# Patient Record
Sex: FEMALE | Race: BLACK OR AFRICAN AMERICAN | NOT HISPANIC OR LATINO | Employment: UNEMPLOYED | URBAN - METROPOLITAN AREA
[De-identification: names, ages, dates, MRNs, and addresses within clinical notes are randomized per-mention and may not be internally consistent; named-entity substitution may affect disease eponyms.]

---

## 2023-06-05 LAB
ABO GROUP (TYPE) IN BLOOD: NORMAL
ANTIBODY SCREEN: NORMAL
ERYTHROCYTE DISTRIBUTION WIDTH (RATIO) BY AUTOMATED COUNT: 12.7 % (ref 11.5–14.5)
ERYTHROCYTE MEAN CORPUSCULAR HEMOGLOBIN CONCENTRATION (G/DL) BY AUTOMATED: 33.4 G/DL (ref 32–36)
ERYTHROCYTE MEAN CORPUSCULAR VOLUME (FL) BY AUTOMATED COUNT: 88 FL (ref 80–100)
ERYTHROCYTES (10*6/UL) IN BLOOD BY AUTOMATED COUNT: 4.26 X10E12/L (ref 4–5.2)
HEMATOCRIT (%) IN BLOOD BY AUTOMATED COUNT: 37.7 % (ref 36–46)
HEMOGLOBIN (G/DL) IN BLOOD: 12.6 G/DL (ref 12–16)
HEPATITIS B VIRUS SURFACE AG PRESENCE IN SERUM: NONREACTIVE
HEPATITIS C VIRUS AB PRESENCE IN SERUM: NONREACTIVE
HIV 1/ 2 AG/AB SCREEN: NONREACTIVE
LEUKOCYTES (10*3/UL) IN BLOOD BY AUTOMATED COUNT: 12.6 X10E9/L (ref 4.4–11.3)
NRBC (PER 100 WBCS) BY AUTOMATED COUNT: 0 /100 WBC (ref 0–0)
PLATELETS (10*3/UL) IN BLOOD AUTOMATED COUNT: 275 X10E9/L (ref 150–450)
REFLEX ADDED, ANEMIA PANEL: ABNORMAL
RH FACTOR: NORMAL
RUBELLA VIRUS IGG AB: POSITIVE
SYPHILIS TOTAL AB: NONREACTIVE

## 2023-06-06 LAB
CHLAMYDIA TRACH., AMPLIFIED: NEGATIVE
HEMOGLOBIN A2: 2.8 %
HEMOGLOBIN A: 96.7 %
HEMOGLOBIN F: 0.5 %
HEMOGLOBIN IDENTIFICATION INTERPRETATION: NORMAL
N. GONORRHEA, AMPLIFIED: NEGATIVE
PATH REVIEW-HGB IDENTIFICATION: NORMAL
URINE CULTURE: NO GROWTH

## 2023-07-13 LAB
ALANINE AMINOTRANSFERASE (SGPT) (U/L) IN SER/PLAS: 9 U/L (ref 7–45)
ALBUMIN (G/DL) IN SER/PLAS: 3.7 G/DL (ref 3.4–5)
ALKALINE PHOSPHATASE (U/L) IN SER/PLAS: 60 U/L (ref 33–110)
ANION GAP IN SER/PLAS: 13 MMOL/L (ref 10–20)
ASPARTATE AMINOTRANSFERASE (SGOT) (U/L) IN SER/PLAS: 13 U/L (ref 9–39)
BILIRUBIN TOTAL (MG/DL) IN SER/PLAS: 0.2 MG/DL (ref 0–1.2)
CALCIUM (MG/DL) IN SER/PLAS: 9 MG/DL (ref 8.6–10.6)
CARBON DIOXIDE, TOTAL (MMOL/L) IN SER/PLAS: 22 MMOL/L (ref 21–32)
CHLORIDE (MMOL/L) IN SER/PLAS: 104 MMOL/L (ref 98–107)
CREATININE (MG/DL) IN SER/PLAS: 0.57 MG/DL (ref 0.5–1.05)
CREATININE (MG/DL) IN URINE: 140 MG/DL (ref 20–320)
GFR FEMALE: >90 ML/MIN/1.73M2
GLUCOSE (MG/DL) IN SER/PLAS: 82 MG/DL (ref 74–99)
LACTATE DEHYDROGENASE (U/L) IN SER/PLAS BY LAC->PYR RXN: 98 U/L (ref 84–246)
POTASSIUM (MMOL/L) IN SER/PLAS: 3.9 MMOL/L (ref 3.5–5.3)
PROTEIN (MG/DL) IN URINE: 10 MG/DL (ref 5–24)
PROTEIN TOTAL: 6.7 G/DL (ref 6.4–8.2)
PROTEIN/CREATININE (MG/MG) IN URINE: 0.07 MG/MG CREAT (ref 0–0.17)
SODIUM (MMOL/L) IN SER/PLAS: 135 MMOL/L (ref 136–145)
THYROTROPIN (MIU/L) IN SER/PLAS BY DETECTION LIMIT <= 0.05 MIU/L: 1.01 MIU/L (ref 0.44–3.98)
URATE (MG/DL) IN SER/PLAS: 3.5 MG/DL (ref 2.3–6.7)
UREA NITROGEN (MG/DL) IN SER/PLAS: 6 MG/DL (ref 6–23)

## 2023-07-14 LAB
ESTIMATED AVERAGE GLUCOSE FOR HBA1C: 105 MG/DL
HEMOGLOBIN A1C/HEMOGLOBIN TOTAL IN BLOOD: 5.3 %

## 2023-10-19 PROBLEM — O10.013: Status: ACTIVE | Noted: 2023-10-19

## 2023-10-19 PROBLEM — O24.419 GESTATIONAL DIABETES MELLITUS (GDM) IN THIRD TRIMESTER (HHS-HCC): Status: ACTIVE | Noted: 2023-10-19

## 2023-10-19 PROBLEM — E04.9 GOITER: Status: ACTIVE | Noted: 2023-10-19

## 2023-10-19 PROBLEM — N91.2 AMENORRHEA: Status: ACTIVE | Noted: 2023-10-19

## 2023-10-19 PROBLEM — O11.9 CHRONIC HYPERTENSION WITH SUPERIMPOSED PREECLAMPSIA (HHS-HCC): Status: ACTIVE | Noted: 2023-10-19

## 2023-10-19 PROBLEM — O13.3 GESTATIONAL HYPERTENSION, THIRD TRIMESTER (HHS-HCC): Status: ACTIVE | Noted: 2023-10-19

## 2023-10-19 PROBLEM — O24.414 INSULIN CONTROLLED GESTATIONAL DIABETES MELLITUS (GDM) DURING PREGNANCY, ANTEPARTUM (HHS-HCC): Status: ACTIVE | Noted: 2023-10-19

## 2023-10-19 PROBLEM — O99.213 OBESITY AFFECTING PREGNANCY IN THIRD TRIMESTER (HHS-HCC): Status: ACTIVE | Noted: 2023-10-19

## 2023-10-19 RX ORDER — LANCETS
EACH MISCELLANEOUS
COMMUNITY
Start: 2022-04-26

## 2023-10-19 RX ORDER — LABETALOL 100 MG/1
1 TABLET, FILM COATED ORAL 2 TIMES DAILY
COMMUNITY
Start: 2022-04-21 | End: 2023-10-20 | Stop reason: ALTCHOICE

## 2023-10-19 RX ORDER — NIFEDIPINE 30 MG/1
1 TABLET, FILM COATED, EXTENDED RELEASE ORAL DAILY
COMMUNITY
Start: 2022-06-16 | End: 2023-10-20 | Stop reason: ALTCHOICE

## 2023-10-20 ENCOUNTER — ROUTINE PRENATAL (OUTPATIENT)
Dept: OBSTETRICS AND GYNECOLOGY | Facility: CLINIC | Age: 23
End: 2023-10-20
Payer: COMMERCIAL

## 2023-10-20 VITALS — WEIGHT: 259 LBS | DIASTOLIC BLOOD PRESSURE: 79 MMHG | BODY MASS INDEX: 39.38 KG/M2 | SYSTOLIC BLOOD PRESSURE: 122 MMHG

## 2023-10-20 DIAGNOSIS — Z3A.36 36 WEEKS GESTATION OF PREGNANCY (HHS-HCC): Primary | ICD-10-CM

## 2023-10-20 DIAGNOSIS — O09.90 HIGH RISK PREGNANCY, ANTEPARTUM (HHS-HCC): ICD-10-CM

## 2023-10-20 PROBLEM — N91.2 AMENORRHEA: Status: RESOLVED | Noted: 2023-10-19 | Resolved: 2023-10-20

## 2023-10-20 PROBLEM — O24.419 GESTATIONAL DIABETES MELLITUS (GDM) IN THIRD TRIMESTER (HHS-HCC): Status: RESOLVED | Noted: 2023-10-19 | Resolved: 2023-10-20

## 2023-10-20 PROBLEM — Z86.32 HISTORY OF GESTATIONAL DIABETES MELLITUS (GDM): Status: ACTIVE | Noted: 2023-10-20

## 2023-10-20 PROBLEM — O10.013: Status: RESOLVED | Noted: 2023-10-19 | Resolved: 2023-10-20

## 2023-10-20 PROBLEM — O09.299 H/O SHOULDER DYSTOCIA IN PRIOR PREGNANCY, CURRENTLY PREGNANT (HHS-HCC): Status: ACTIVE | Noted: 2023-10-20

## 2023-10-20 PROBLEM — O13.3 GESTATIONAL HYPERTENSION, THIRD TRIMESTER (HHS-HCC): Status: RESOLVED | Noted: 2023-10-19 | Resolved: 2023-10-20

## 2023-10-20 PROBLEM — E04.9 GOITER: Status: RESOLVED | Noted: 2023-10-19 | Resolved: 2023-10-20

## 2023-10-20 PROBLEM — O11.9 CHRONIC HYPERTENSION WITH SUPERIMPOSED PREECLAMPSIA (HHS-HCC): Status: RESOLVED | Noted: 2023-10-19 | Resolved: 2023-10-20

## 2023-10-20 PROBLEM — Z87.59 H/O SEVERE PRE-ECLAMPSIA: Status: ACTIVE | Noted: 2023-10-20

## 2023-10-20 PROBLEM — O24.414 INSULIN CONTROLLED GESTATIONAL DIABETES MELLITUS (GDM) DURING PREGNANCY, ANTEPARTUM (HHS-HCC): Status: RESOLVED | Noted: 2023-10-19 | Resolved: 2023-10-20

## 2023-10-20 PROCEDURE — 99214 OFFICE O/P EST MOD 30 MIN: CPT | Mod: GC,TH | Performed by: STUDENT IN AN ORGANIZED HEALTH CARE EDUCATION/TRAINING PROGRAM

## 2023-10-20 PROCEDURE — 87081 CULTURE SCREEN ONLY: CPT | Performed by: STUDENT IN AN ORGANIZED HEALTH CARE EDUCATION/TRAINING PROGRAM

## 2023-10-20 PROCEDURE — 99214 OFFICE O/P EST MOD 30 MIN: CPT | Performed by: STUDENT IN AN ORGANIZED HEALTH CARE EDUCATION/TRAINING PROGRAM

## 2023-10-20 NOTE — PROGRESS NOTES
Subjective   Patient ID [id number]     Lavon Longo is a 23 y.o.  at 36w0d with a working estimated date of delivery of 2023, by Ultrasound who presents for a routine prenatal visit. She denies vaginal bleeding, leakage of fluid, decreased fetal movements, or contractions.    Her pregnancy is complicated by:  - significant n/v early in pregnancy which she says is now improving with only 2 emesis/ week. Of note, weight on 23 was 262 lbs and on 10/20/23 is 259 lbs  - previous pregnancy notable for IOL @ 36w in s/o sPEC and GDMA2 c/b shoulder dystocia x 1 min    Objective   Physical Exam:   Weight: 117 kg (259 lb)  Expected Total Weight Gain: Could not be calculated   Pregravid BMI: Could not be calculated  BP: 122/79  Fetal Heart Rate: 155             Physical Exam  Constitutional:       Appearance: Normal appearance.   HENT:      Head: Normocephalic and atraumatic.   Eyes:      Extraocular Movements: Extraocular movements intact.      Conjunctiva/sclera: Conjunctivae normal.   Pulmonary:      Effort: Pulmonary effort is normal.   Musculoskeletal:         General: Normal range of motion.      Cervical back: Normal range of motion and neck supple.   Neurological:      General: No focal deficit present.      Mental Status: She is alert and oriented to person, place, and time.   Skin:     General: Skin is warm and dry.   Psychiatric:         Mood and Affect: Mood normal.         Behavior: Behavior normal.   Vitals reviewed.        Prenatal Labs  - PNL: wnl  - NO 1 hr GCT done  Lab Results   Component Value Date    HGB 12.6 2023    HCT 37.7 2023    ABO B 2023    HEPBSAG NONREACTIVE 2023       Imaging  The most recent ultrasound was performed on 23 with a study GA of 20w0d (ELVIS: 23) and EFW of 318g.  - anatomy scan: wnl    Assessment/Plan   Problem List Items Addressed This Visit       High risk pregnancy, antepartum    Overview     Plans to breastfeed  Interested in  [facility]         Current Assessment & Plan     - scant care  - declined flu vaccine  - unable to complete 2nd tri labs today  - unable to stay for NST  - has not completed glucose screening, does not have supplies at home to check BG  - GBS collected today  - plan to RTC for appt and growth          Other Visit Diagnoses       36 weeks gestation of pregnancy    -  Primary    Relevant Orders    Group B Streptococcus (GBS) Prenatal Screen, Culture    US OB follow UP transabdominal approach          Seen and discussed with Dileep Ayon and Rosalinda Puente, MS3    Patient seen and evaluated with medical student. Agree with assessment above, edits made within text.   Tanya Ayon MD, PGY-3

## 2023-10-21 NOTE — ASSESSMENT & PLAN NOTE
- scant care  - declined flu vaccine  - unable to complete 2nd tri labs today  - unable to stay for NST  - has not completed glucose screening, does not have supplies at home to check BG  - GBS collected today  - plan to RTC for appt and growth

## 2023-10-23 LAB — GP B STREP GENITAL QL CULT: NORMAL

## 2023-10-24 ENCOUNTER — ANCILLARY PROCEDURE (OUTPATIENT)
Dept: RADIOLOGY | Facility: CLINIC | Age: 23
End: 2023-10-24
Payer: COMMERCIAL

## 2023-10-24 ENCOUNTER — ROUTINE PRENATAL (OUTPATIENT)
Dept: OBSTETRICS AND GYNECOLOGY | Facility: CLINIC | Age: 23
End: 2023-10-24
Payer: COMMERCIAL

## 2023-10-24 VITALS — DIASTOLIC BLOOD PRESSURE: 71 MMHG | BODY MASS INDEX: 39.73 KG/M2 | SYSTOLIC BLOOD PRESSURE: 123 MMHG | WEIGHT: 261.3 LBS

## 2023-10-24 DIAGNOSIS — Z86.32 HISTORY OF GESTATIONAL DIABETES MELLITUS (GDM): ICD-10-CM

## 2023-10-24 DIAGNOSIS — Z87.59 H/O SEVERE PRE-ECLAMPSIA: ICD-10-CM

## 2023-10-24 DIAGNOSIS — Z3A.36 36 WEEKS GESTATION OF PREGNANCY (HHS-HCC): Primary | ICD-10-CM

## 2023-10-24 DIAGNOSIS — O09.299 H/O SHOULDER DYSTOCIA IN PRIOR PREGNANCY, CURRENTLY PREGNANT (HHS-HCC): ICD-10-CM

## 2023-10-24 DIAGNOSIS — O09.90 HIGH RISK PREGNANCY, ANTEPARTUM (HHS-HCC): ICD-10-CM

## 2023-10-24 DIAGNOSIS — Z03.74 ENCOUNTER FOR SUSPECTED PROBLEM WITH FETAL GROWTH RULED OUT: ICD-10-CM

## 2023-10-24 PROBLEM — E66.9 OBESITY: Status: ACTIVE | Noted: 2023-08-10

## 2023-10-24 PROCEDURE — 90715 TDAP VACCINE 7 YRS/> IM: CPT | Performed by: OBSTETRICS & GYNECOLOGY

## 2023-10-24 PROCEDURE — 99214 OFFICE O/P EST MOD 30 MIN: CPT | Mod: TH | Performed by: OBSTETRICS & GYNECOLOGY

## 2023-10-24 PROCEDURE — 76819 FETAL BIOPHYS PROFIL W/O NST: CPT | Performed by: OBSTETRICS & GYNECOLOGY

## 2023-10-24 PROCEDURE — 99214 OFFICE O/P EST MOD 30 MIN: CPT | Performed by: OBSTETRICS & GYNECOLOGY

## 2023-10-24 PROCEDURE — 76819 FETAL BIOPHYS PROFIL W/O NST: CPT

## 2023-10-24 PROCEDURE — 76816 OB US FOLLOW-UP PER FETUS: CPT | Performed by: OBSTETRICS & GYNECOLOGY

## 2023-10-24 PROCEDURE — 76816 OB US FOLLOW-UP PER FETUS: CPT

## 2023-10-24 ASSESSMENT — ENCOUNTER SYMPTOMS
HEMATOLOGIC/LYMPHATIC NEGATIVE: 0
PSYCHIATRIC NEGATIVE: 0
NEUROLOGICAL NEGATIVE: 0
MUSCULOSKELETAL NEGATIVE: 0
ENDOCRINE NEGATIVE: 0
RESPIRATORY NEGATIVE: 0
ALLERGIC/IMMUNOLOGIC NEGATIVE: 0
CONSTITUTIONAL NEGATIVE: 0
EYES NEGATIVE: 0
CARDIOVASCULAR NEGATIVE: 0
GASTROINTESTINAL NEGATIVE: 0

## 2023-10-24 NOTE — PROGRESS NOTES
"Subjective   Patient ID [id number]   Lavon Longo is a 23 y.o.  at 36w4d with a working estimated date of delivery of 2023, by Ultrasound who presents for a routine prenatal visit. She denies vaginal bleeding, leakage of fluid, decreased fetal movements, or contractions.    Her pregnancy is complicated by:  Pregnancy Problems (from 10/20/23 to present)       Problem Noted Resolved    36 weeks gestation of pregnancy 10/24/2023 by Corinne A Bazella, MD No    Priority:  Medium      H/O severe pre-eclampsia 10/20/2023 by Tanya Ayon MD No    Priority:  Medium      History of gestational diabetes mellitus (GDM) 10/20/2023 by Tanya Ayon MD No    Priority:  Medium      High risk pregnancy, antepartum 10/20/2023 by Tanya Ayon MD No    Priority:  Medium      Overview Addendum 10/24/2023  5:06 PM by Corinne A Bazella, MD     Datin week US uncertain LMP  [] Initial BMI:   [x] Prenatal Labs:   [] Genetic Screening:    [] Baby ASA:  [] Anatomy US:  [] 1hr GCT at 24-28wks:  [x] Tdap (27-36wks): 10/24  [] Flu Shot:  [] COVID vaccine:   [] Rhogam (if Rh neg):   [] GBS at 36 wks: collected/pending   [x] Breastfeeding- yes   [x] Postpartum Birth control method: LARC  [] 39 weeks discussion of IOL vs. Expectant management:  [] Mode of delivery:           H/O shoulder dystocia in prior pregnancy, currently pregnant 10/20/2023 by Tanya Ayon MD No    Priority:  Medium      Overview Addendum 10/24/2023  5:42 PM by Pauline Dobson MD     GDM in G1, patient unaware at the time, child with no deficits  Patient desires vaginal delivery          Obesity 8/10/2023 by Giovana Garcia No    Priority:  Medium               Objective   Physical Exam:   Weight: 119 kg (261 lb 4.8 oz)  Expected Total Weight Gain: Could not be calculated   Pregravid BMI: Could not be calculated  BP: 123/71  Fetal Heart Rate: 152               Prenatal Labs  Urine Dip:  No results found for: \"KETONESU\", \"GLUCOSEUR\", " "\"LEUKOCYTESUR\"  Lab Results   Component Value Date    HGB 12.6 06/05/2023    HCT 37.7 06/05/2023    ABO B 06/05/2023    HEPBSAG NONREACTIVE 06/05/2023     No results found for: \"PAPPA\", \"AFP\", \"HCG\", \"ESTRIOL\", \"INHBA\"  No results found for: \"GLUF\", \"GLUT1\", \"DZUWYJV3FO\", \"UKPYOFI9GP\"    Imaging  The most recent ultrasound was performed on The most recent ultrasound study is not finalized with a study GA of The most recent ultrasound study is not finalized and EFW of The most recent ultrasound study is not finalized.  The most recent ultrasound study is not finalized  The most recent ultrasound study is not finalized    Assessment/Plan   Problem List Items Addressed This Visit             ICD-10-CM    36 weeks gestation of pregnancy - Primary Z3A.36    Relevant Orders    Glucose, 1 Hour Screen, Pregnancy    CBC Anemia Panel With Reflex, Pregnancy    H/O shoulder dystocia in prior pregnancy, currently pregnant O09.299    High risk pregnancy, antepartum O09.90    Relevant Orders    Tdap vaccine, age 7 years and older  (BOOSTRIX) (Completed)    History of gestational diabetes mellitus (GDM) Z86.32     Continue prenatal vitamin.  Labs reviewed, second trimester labs reviewed, to get done along w/ 1-hour tomorrow  Growth US today  GBS pending  Expected mode of delivery vaginal delivery. Pt counseled on risk of repeat shoulder, desires vaginal delivery   Follow up in 1 week for a routine prenatal visit.    Pt seen and dw Dr Nasim Dobson MD  OB/GYN PGY3   "

## 2023-10-26 NOTE — PROGRESS NOTES
I saw and evaluated the patient. I personally obtained the key and critical portions of the history and physical exam or was physically present for key and critical portions performed by the resident/fellow. I reviewed the resident/fellow's documentation and discussed the patient with the resident/fellow. I agree with the resident/fellow's medical decision making as documented in the note.    Corinne A Bazella, MD

## 2023-10-29 ENCOUNTER — HOSPITAL ENCOUNTER (OUTPATIENT)
Facility: HOSPITAL | Age: 23
Discharge: HOME | End: 2023-10-29
Attending: OBSTETRICS & GYNECOLOGY | Admitting: OBSTETRICS & GYNECOLOGY
Payer: COMMERCIAL

## 2023-10-29 VITALS
HEART RATE: 85 BPM | DIASTOLIC BLOOD PRESSURE: 58 MMHG | TEMPERATURE: 96.8 F | RESPIRATION RATE: 16 BRPM | SYSTOLIC BLOOD PRESSURE: 122 MMHG | OXYGEN SATURATION: 100 %

## 2023-10-29 PROBLEM — Z3A.37 37 WEEKS GESTATION OF PREGNANCY (HHS-HCC): Status: ACTIVE | Noted: 2023-10-24

## 2023-10-29 PROBLEM — R07.82 INTERCOSTAL PAIN: Status: ACTIVE | Noted: 2023-10-29

## 2023-10-29 LAB
ERYTHROCYTE [DISTWIDTH] IN BLOOD BY AUTOMATED COUNT: 13.3 % (ref 11.5–14.5)
EST. AVERAGE GLUCOSE BLD GHB EST-MCNC: 103 MG/DL
HBA1C MFR BLD: 5.2 %
HCT VFR BLD AUTO: 37 % (ref 36–46)
HGB BLD-MCNC: 12.1 G/DL (ref 12–16)
MCH RBC QN AUTO: 29.4 PG (ref 26–34)
MCHC RBC AUTO-ENTMCNC: 32.7 G/DL (ref 32–36)
MCV RBC AUTO: 90 FL (ref 80–100)
NRBC BLD-RTO: 0 /100 WBCS (ref 0–0)
PLATELET # BLD AUTO: 234 X10*3/UL (ref 150–450)
PMV BLD AUTO: 10.7 FL (ref 7.5–11.5)
RBC # BLD AUTO: 4.11 X10*6/UL (ref 4–5.2)
WBC # BLD AUTO: 13.1 X10*3/UL (ref 4.4–11.3)

## 2023-10-29 PROCEDURE — 99214 OFFICE O/P EST MOD 30 MIN: CPT

## 2023-10-29 PROCEDURE — 83036 HEMOGLOBIN GLYCOSYLATED A1C: CPT | Mod: AHULAB | Performed by: OBSTETRICS & GYNECOLOGY

## 2023-10-29 PROCEDURE — 36415 COLL VENOUS BLD VENIPUNCTURE: CPT | Performed by: OBSTETRICS & GYNECOLOGY

## 2023-10-29 PROCEDURE — 99214 OFFICE O/P EST MOD 30 MIN: CPT | Performed by: OBSTETRICS & GYNECOLOGY

## 2023-10-29 PROCEDURE — 85027 COMPLETE CBC AUTOMATED: CPT | Performed by: OBSTETRICS & GYNECOLOGY

## 2023-10-29 RX ORDER — NIFEDIPINE 10 MG/1
10 CAPSULE ORAL ONCE AS NEEDED
Status: DISCONTINUED | OUTPATIENT
Start: 2023-10-29 | End: 2023-10-29 | Stop reason: HOSPADM

## 2023-10-29 RX ORDER — HYDRALAZINE HYDROCHLORIDE 20 MG/ML
5 INJECTION INTRAMUSCULAR; INTRAVENOUS ONCE AS NEEDED
Status: DISCONTINUED | OUTPATIENT
Start: 2023-10-29 | End: 2023-10-29 | Stop reason: HOSPADM

## 2023-10-29 RX ORDER — ONDANSETRON 4 MG/1
4 TABLET, FILM COATED ORAL EVERY 6 HOURS PRN
Status: DISCONTINUED | OUTPATIENT
Start: 2023-10-29 | End: 2023-10-29 | Stop reason: HOSPADM

## 2023-10-29 RX ORDER — ONDANSETRON HYDROCHLORIDE 2 MG/ML
4 INJECTION, SOLUTION INTRAVENOUS EVERY 6 HOURS PRN
Status: DISCONTINUED | OUTPATIENT
Start: 2023-10-29 | End: 2023-10-29 | Stop reason: HOSPADM

## 2023-10-29 RX ORDER — LABETALOL HYDROCHLORIDE 5 MG/ML
20 INJECTION, SOLUTION INTRAVENOUS ONCE AS NEEDED
Status: DISCONTINUED | OUTPATIENT
Start: 2023-10-29 | End: 2023-10-29 | Stop reason: HOSPADM

## 2023-10-29 SDOH — HEALTH STABILITY: MENTAL HEALTH: WISH TO BE DEAD (PAST 1 MONTH): NO

## 2023-10-29 SDOH — SOCIAL STABILITY: SOCIAL INSECURITY: HAS ANYONE EVER THREATENED TO HURT YOUR FAMILY OR YOUR PETS?: NO

## 2023-10-29 SDOH — HEALTH STABILITY: MENTAL HEALTH: NON-SPECIFIC ACTIVE SUICIDAL THOUGHTS (PAST 1 MONTH): NO

## 2023-10-29 SDOH — SOCIAL STABILITY: SOCIAL INSECURITY: ARE THERE ANY APPARENT SIGNS OF INJURIES/BEHAVIORS THAT COULD BE RELATED TO ABUSE/NEGLECT?: NO

## 2023-10-29 SDOH — SOCIAL STABILITY: SOCIAL INSECURITY: DO YOU FEEL ANYONE HAS EXPLOITED OR TAKEN ADVANTAGE OF YOU FINANCIALLY OR OF YOUR PERSONAL PROPERTY?: NO

## 2023-10-29 SDOH — HEALTH STABILITY: MENTAL HEALTH: WERE YOU ABLE TO COMPLETE ALL THE BEHAVIORAL HEALTH SCREENINGS?: YES

## 2023-10-29 SDOH — SOCIAL STABILITY: SOCIAL INSECURITY: VERBAL ABUSE: DENIES

## 2023-10-29 SDOH — ECONOMIC STABILITY: HOUSING INSECURITY: DO YOU FEEL UNSAFE GOING BACK TO THE PLACE WHERE YOU ARE LIVING?: NO

## 2023-10-29 SDOH — SOCIAL STABILITY: SOCIAL INSECURITY: HAVE YOU HAD THOUGHTS OF HARMING ANYONE ELSE?: YES

## 2023-10-29 SDOH — SOCIAL STABILITY: SOCIAL INSECURITY: ABUSE SCREEN: ADULT

## 2023-10-29 SDOH — HEALTH STABILITY: MENTAL HEALTH: SUICIDAL BEHAVIOR (LIFETIME): NO

## 2023-10-29 SDOH — SOCIAL STABILITY: SOCIAL INSECURITY: DOES ANYONE TRY TO KEEP YOU FROM HAVING/CONTACTING OTHER FRIENDS OR DOING THINGS OUTSIDE YOUR HOME?: NO

## 2023-10-29 SDOH — SOCIAL STABILITY: SOCIAL INSECURITY: PHYSICAL ABUSE: DENIES

## 2023-10-29 SDOH — SOCIAL STABILITY: SOCIAL INSECURITY: ARE YOU OR HAVE YOU BEEN THREATENED OR ABUSED PHYSICALLY, EMOTIONALLY, OR SEXUALLY BY ANYONE?: NO

## 2023-10-29 ASSESSMENT — LIFESTYLE VARIABLES
HOW OFTEN DO YOU HAVE A DRINK CONTAINING ALCOHOL: NEVER
HOW OFTEN DO YOU HAVE 6 OR MORE DRINKS ON ONE OCCASION: NEVER
SKIP TO QUESTIONS 9-10: 1
AUDIT-C TOTAL SCORE: 0
HOW MANY STANDARD DRINKS CONTAINING ALCOHOL DO YOU HAVE ON A TYPICAL DAY: PATIENT DOES NOT DRINK
AUDIT-C TOTAL SCORE: 0

## 2023-10-29 ASSESSMENT — PAIN SCALES - GENERAL
PAINLEVEL_OUTOF10: 5 - MODERATE PAIN
PAINLEVEL_OUTOF10: 5 - MODERATE PAIN

## 2023-10-29 ASSESSMENT — PATIENT HEALTH QUESTIONNAIRE - PHQ9
1. LITTLE INTEREST OR PLEASURE IN DOING THINGS: NOT AT ALL
2. FEELING DOWN, DEPRESSED OR HOPELESS: NOT AT ALL
SUM OF ALL RESPONSES TO PHQ9 QUESTIONS 1 & 2: 0

## 2023-10-29 NOTE — DISCHARGE SUMMARY
Discharge Summary    Admission Date: 10/29/2023  Discharge Date: 10/29/2023    Discharge Diagnosis  Chest wall pain, 37 weeks.     Hospital Course  Patient presented at 37 weeks gestation with chest wall pain. Evaluation included normal ECG, normal oxygen saturation, and reproduction of pain with palpation of chest wall. She was reassured and FHR remained category 1. Review of prenatal records also demonstrated she had not had glucose testing and has a history of insulin dependent gestational diabetes last pregnancy. At first she agreed to have glucola, CBC and Hgb A1c performed while here, but she later decided to leave. She did allow CBC and Hgb A1c to be drawn prior to discharge. She was encouraged to keep next scheduled prenatal visit in 2 days.     Procedures: none    Pertinent Physical Exam At Time of Discharge  GENERAL: Examination reveals a well developed, well nourished, gravid female in no acute distress. She is alert and cooperative.  ABDOMEN: soft, gravid, nontender, nondistended, no abnormal masses, no epigastric pain  PSYCHOLOGICAL: awake and alert; oriented to person, place, and time      Discharge Meds     Your medication list        CONTINUE taking these medications        Instructions Last Dose Given Next Dose Due   lancets misc                  ASK your doctor about these medications        Instructions Last Dose Given Next Dose Due   aspirin 81 mg EC tablet      TAKE 2 TABLET DAILY       PRECARE PO           Prenatal 27 mg iron-800 mcg folic acid tablet  Generic drug: prenatal vitamin (iron-folic)      TAKE 1 TABLET BY MOUTH ONCE DAILY                 Complications Requiring Follow-Up  Prenatal care, gestational diabetes testing.    Test Results Pending At Discharge  Pending Labs       No current pending labs.            Outpatient Follow-Up  Future Appointments   Date Time Provider Department Center   10/31/2023  2:15 PM [id number] OBGYN RESIDENT PGY 1 [id number]       I spent 30  minutes in the professional and overall care of this patient.      Peyton Shirley MD

## 2023-10-29 NOTE — H&P
Obstetrical Admission History and Physical     Lavon Longo is a 23 y.o. . She presents at 37.2 weeks gestation with intermittent left chest pain.    Chief Complaint: Chest Pain    Assessment/Plan    Left chest wall pain is reproduced with palpation of left chest. There does not appear to be any cardiac or respiratory compromise. She was reassured and will try Tylenol and ice/heat for discomfort.   Will also proceed with glucola along with CBC and Hgb A1c given past history of insulin dependent gestational diabetes with shoulder dystocia along with missing gestational diabetes screening this pregnancy. She is encouraged to keep follow up as scheduled in 2 days with her OB office.     Active Problems:    History of gestational diabetes mellitus (GDM)    37 weeks gestation of pregnancy    Intercostal pain      Pregnancy Problems (from 10/20/23 to present)       Problem Noted Resolved    37 weeks gestation of pregnancy 10/24/2023 by Corinne A Bazella, MD No    Priority:  Medium      H/O severe pre-eclampsia 10/20/2023 by Tanya Ayon MD No    Priority:  Medium      History of gestational diabetes mellitus (GDM) 10/20/2023 by Tanya Ayon MD No    Priority:  Medium      High risk pregnancy, antepartum 10/20/2023 by Tanya Ayon MD No    Priority:  Medium      Overview Addendum 10/24/2023  5:06 PM by Corinne A Bazella, MD     Datin week US uncertain LMP  [] Initial BMI:   [x] Prenatal Labs:   [] Genetic Screening:    [] Baby ASA:  [] Anatomy US:  [] 1hr GCT at 24-28wks:  [x] Tdap (27-36wks): 10/24  [] Flu Shot:  [] COVID vaccine:   [] Rhogam (if Rh neg):   [] GBS at 36 wks: collected/pending   [x] Breastfeeding- yes   [x] Postpartum Birth control method: LARC  [] 39 weeks discussion of IOL vs. Expectant management:  [] Mode of delivery:           H/O shoulder dystocia in prior pregnancy, currently pregnant 10/20/2023 by Tanya Ayon MD No    Priority:  Medium      Overview Addendum  10/24/2023  5:42 PM by Pauline Dobson MD     GDM in G1, patient unaware at the time, child with no deficits  Patient desires vaginal delivery          Obesity 8/10/2023 by Giovana Garcia No    Priority:  Medium            Subjective   Lavon is here complaining of intermittent left chest pain lasting 30 seconds or so. This reminds her of prior pain she can have when a gas bubble is trapped under her ribs, but the location is higher. She denies any recent injury or exercise. She has no shortness of breath, lightheadedness or other concerns. This pain does seem to be improved since she has been here. Good fetal movement. Denies vaginal bleeding., Denies contractions., Denies leaking of fluid.           Obstetrical History   OB History    Para Term  AB Living   2 1 0 1 0 1   SAB IAB Ectopic Multiple Live Births   0 0 0 0 1      # Outcome Date GA Lbr [place]/2nd Weight Sex Delivery Anes PTL Lv   2 Current            1  22 36w0d  2920 g M Vag-Spont   LUCIANO      Complications: Shoulder Dystocia       Past Medical History  No past medical history on file.     Past Surgical History   No past surgical history on file.    Social History  Social History     Tobacco Use    Smoking status: Not on file    Smokeless tobacco: Not on file   Substance Use Topics    Alcohol use: Not on file     Substance and Sexual Activity   Drug Use Not on file       Allergies  Patient has no known allergies.     Medications  Medications Prior to Admission   Medication Sig Dispense Refill Last Dose    aspirin 81 mg EC tablet TAKE 2 TABLET DAILY 60 tablet 6     lancets misc 4 x day       prenatal vitamin, iron-folic, 27 mg iron-800 mcg folic acid tablet TAKE 1 TABLET BY MOUTH ONCE DAILY 30 tablet 11     Pv w-o Vit A/ferrous fum/folic (PRECARE PO) Take 1 tablet by mouth once daily.          Objective    Last Vitals  Temp Pulse Resp BP MAP O2 Sat   36 °C (96.8 °F) 92 16 124/62   99 %     Physical Examination  GENERAL: Examination  reveals a well developed, well nourished and overweight, gravid female in no acute distress. She is alert and cooperative.  LUNGS: clear to auscultation bilaterally  HEART: regular rate and rhythm, S1, S2 normal, no murmur, click, rub or gallop  ABDOMEN: soft, gravid, nontender, nondistended, no abnormal masses, no epigastric pain  FHR is 140 , with Accelerations, and a Category I tracing.    Colma reading:  Irritability without regular contractions  EXTREMITIES: no redness or tenderness in the calves or thighs, no edema  PSYCHOLOGICAL: awake and alert; oriented to person, place, and time  ECG is reviewed and shows normal sinus rhythm with sinus arrhythmia, Normal ECG.  Lab Review  Labs in chart were reviewed.

## 2023-10-31 ENCOUNTER — ROUTINE PRENATAL (OUTPATIENT)
Dept: OBSTETRICS AND GYNECOLOGY | Facility: CLINIC | Age: 23
End: 2023-10-31
Payer: COMMERCIAL

## 2023-10-31 VITALS — BODY MASS INDEX: 39.08 KG/M2 | SYSTOLIC BLOOD PRESSURE: 133 MMHG | WEIGHT: 257 LBS | DIASTOLIC BLOOD PRESSURE: 79 MMHG

## 2023-10-31 DIAGNOSIS — O09.299 H/O SHOULDER DYSTOCIA IN PRIOR PREGNANCY, CURRENTLY PREGNANT (HHS-HCC): ICD-10-CM

## 2023-10-31 DIAGNOSIS — Z34.90 ENCOUNTER FOR INDUCTION OF LABOR (HHS-HCC): Primary | ICD-10-CM

## 2023-10-31 DIAGNOSIS — Z87.59 H/O SEVERE PRE-ECLAMPSIA: ICD-10-CM

## 2023-10-31 DIAGNOSIS — O09.90 HIGH RISK PREGNANCY, ANTEPARTUM (HHS-HCC): ICD-10-CM

## 2023-10-31 DIAGNOSIS — Z3A.37 37 WEEKS GESTATION OF PREGNANCY (HHS-HCC): ICD-10-CM

## 2023-10-31 DIAGNOSIS — Z86.32 HISTORY OF GESTATIONAL DIABETES MELLITUS (GDM): ICD-10-CM

## 2023-10-31 LAB — GLUCOSE BLD MANUAL STRIP-MCNC: 54 MG/DL (ref 74–99)

## 2023-10-31 PROCEDURE — 82947 ASSAY GLUCOSE BLOOD QUANT: CPT

## 2023-10-31 PROCEDURE — 99211 OFF/OP EST MAY X REQ PHY/QHP: CPT | Performed by: OBSTETRICS & GYNECOLOGY

## 2023-10-31 ASSESSMENT — ENCOUNTER SYMPTOMS: CONSTITUTIONAL NEGATIVE: 1

## 2023-10-31 NOTE — PROGRESS NOTES
Pt not seen- error    Subjective   Patient ID [id number]   Lavon Longo is a 23 y.o.  at 37w4d with a working estimated date of delivery of 2023, by Ultrasound who presents for a routine prenatal visit.     States irregular contractions, denies vaginal bleeding, leakage of fluid, and decreased fetal movements. Denies CP/SOB/RUQ pain/changes in vision.     Her pregnancy is complicated by:  - hx of GDM and PEC with G1      Objective   Physical Exam:   Weight: 117 kg (257 lb)  Expected Total Weight Gain: Could not be calculated   Pregravid BMI: Could not be calculated  BP: 133/79                  Prenatal Labs  Urine Dip:    Lab Results   Component Value Date    HGB 12.1 10/29/2023    HCT 37.0 10/29/2023    ABO B 2023    HEPBSAG NONREACTIVE 2023       Imaging  The most recent ultrasound was performed on 10/24 with a study GA of 36.4 and EFW of 2604g 20%.       Assessment/Plan   Problem List Items Addressed This Visit             ICD-10-CM       Ob-Gyn Problems    High risk pregnancy, antepartum O09.90     - scant care  - declined flu vaccine  - unable to complete glucose screening today, will do tomorrow at home and return to lab after 1 hour  - unable to stay for NST, will schedule for later this week   - GBS pending   - desires PP LARC  - schedule 39 week IOL  - return for 1 week f/u         Relevant Orders    Fetal nonstress test    Follow Up In Obstetrics    H/O shoulder dystocia in prior pregnancy, currently pregnant O09.299    H/O severe pre-eclampsia Z87.59     - BP today 133/79  - asymptomatic         37 weeks gestation of pregnancy Z3A.37       Other    History of gestational diabetes mellitus (GDM) Z86.32     - HbA1c 10/29 5.2%, POCT today: 54    - unable to complete glucose screening today, will do tomorrow at home and return to lab after 1 hour          Relevant Orders    Follow Up In Obstetrics

## 2023-10-31 NOTE — ASSESSMENT & PLAN NOTE
- scant care  - declined flu vaccine  - unable to complete glucose screening today, will do tomorrow at home and return to lab after 1 hour  - unable to stay for NST, will schedule for later this week   - GBS pending   - desires PP LARC  - schedule 39 week IOL  - return for 1 week f/u

## 2023-10-31 NOTE — ASSESSMENT & PLAN NOTE
- HbA1c 10/29 5.2%, POCT today: 54    - unable to complete glucose screening today, will do tomorrow at home and return to lab after 1 hour

## 2023-11-07 ENCOUNTER — ROUTINE PRENATAL (OUTPATIENT)
Dept: OBSTETRICS AND GYNECOLOGY | Facility: CLINIC | Age: 23
End: 2023-11-07
Payer: COMMERCIAL

## 2023-11-07 ENCOUNTER — LAB (OUTPATIENT)
Dept: LAB | Facility: LAB | Age: 23
End: 2023-11-07
Payer: COMMERCIAL

## 2023-11-07 VITALS — DIASTOLIC BLOOD PRESSURE: 78 MMHG | BODY MASS INDEX: 40.14 KG/M2 | WEIGHT: 264 LBS | SYSTOLIC BLOOD PRESSURE: 133 MMHG

## 2023-11-07 DIAGNOSIS — O09.299 H/O SHOULDER DYSTOCIA IN PRIOR PREGNANCY, CURRENTLY PREGNANT (HHS-HCC): ICD-10-CM

## 2023-11-07 DIAGNOSIS — Z3A.36 36 WEEKS GESTATION OF PREGNANCY (HHS-HCC): ICD-10-CM

## 2023-11-07 DIAGNOSIS — Z86.32 HISTORY OF GESTATIONAL DIABETES MELLITUS (GDM): ICD-10-CM

## 2023-11-07 DIAGNOSIS — Z87.59 H/O SEVERE PRE-ECLAMPSIA: ICD-10-CM

## 2023-11-07 DIAGNOSIS — O09.90 HIGH RISK PREGNANCY, ANTEPARTUM (HHS-HCC): ICD-10-CM

## 2023-11-07 DIAGNOSIS — Z3A.38 38 WEEKS GESTATION OF PREGNANCY (HHS-HCC): ICD-10-CM

## 2023-11-07 LAB
ERYTHROCYTE [DISTWIDTH] IN BLOOD BY AUTOMATED COUNT: 13.7 % (ref 11.5–14.5)
HCT VFR BLD AUTO: 37.4 % (ref 36–46)
HGB BLD-MCNC: 11.7 G/DL (ref 12–16)
MCH RBC QN AUTO: 28.7 PG (ref 26–34)
MCHC RBC AUTO-ENTMCNC: 31.3 G/DL (ref 32–36)
MCV RBC AUTO: 92 FL (ref 80–100)
NRBC BLD-RTO: 0 /100 WBCS (ref 0–0)
PLATELET # BLD AUTO: 204 X10*3/UL (ref 150–450)
RBC # BLD AUTO: 4.07 X10*6/UL (ref 4–5.2)
WBC # BLD AUTO: 12.5 X10*3/UL (ref 4.4–11.3)

## 2023-11-07 PROCEDURE — 36415 COLL VENOUS BLD VENIPUNCTURE: CPT

## 2023-11-07 PROCEDURE — 99214 OFFICE O/P EST MOD 30 MIN: CPT | Performed by: OBSTETRICS & GYNECOLOGY

## 2023-11-07 PROCEDURE — 82947 ASSAY GLUCOSE BLOOD QUANT: CPT

## 2023-11-07 PROCEDURE — 85027 COMPLETE CBC AUTOMATED: CPT

## 2023-11-07 PROCEDURE — 86780 TREPONEMA PALLIDUM: CPT

## 2023-11-07 PROCEDURE — 99214 OFFICE O/P EST MOD 30 MIN: CPT | Mod: GC,TH | Performed by: OBSTETRICS & GYNECOLOGY

## 2023-11-07 NOTE — PROGRESS NOTES
Ob Follow-up        SUBJECTIVE    HPI: Lavon Longo is a 23 y.o.  at 38w4d here for RPNV.  She has no contractions, no bleeding, or no LOF. Reports goog normal fetal movement. Patient reports that she may not want her IOL scheduled in  and may want to move it to between -.       OBJECTIVE  Visit Vitals  /78   Wt 120 kg (264 lb)   BMI 40.14 kg/m²   OB Status Pregnant   Smoking Status Never   BSA 2.4 m²      ASSESSMENT & PLAN    Lavon Longo is a 23 y.o.  at 38w4d here for the following concerns we addressed today:    Problem List Items Addressed This Visit          Ob-Gyn Problems    H/O severe pre-eclampsia    High risk pregnancy, antepartum    Overview     Datin week US uncertain LMP  [] Initial BMI:   [x] Prenatal Labs:   [] Genetic Screening:    [x] Baby ASA:  [] Anatomy US:  [] 1hr GCT at 24-28wks:  [x] Tdap (27-36wks): 10/24  [] Flu Shot:  [] COVID vaccine:   [] Rhogam (if Rh neg):   [x] GBS at 36 wks: neg  [x] Breastfeeding- yes   [x] Postpartum Birth control method: LARC  [] 39 weeks discussion of IOL vs. Expectant management:  [x] Mode of delivery:  IOL scheduled- we discussed with h./o shoulder- we cannot predict if this will happen again- however she is at greater risk due to this history and prolonging the pregnancy allows the fetus to increase in size.         Relevant Orders    Syphilis Screen with Reflex    H/O shoulder dystocia in prior pregnancy, currently pregnant    Overview     GDM in G1, patient unaware at the time, child with no deficits  Patient desires vaginal delivery          38 weeks gestation of pregnancy       Other    History of gestational diabetes mellitus (GDM)         Orders Placed This Encounter   Procedures    Syphilis Screen with Reflex     Standing Status:   Future     Standing Expiration Date:   2024     Order Specific Question:   Release result to [employer]     Answer:   Immediate [1]      Seen and discussed with Dr. Rouse  [facility]  in 1 week or for IOL on 11/13  Emma Murray MD PGY-2

## 2023-11-07 NOTE — PROGRESS NOTES
Ob Follow-up        SUBJECTIVE    HPI: Lavon Longo is a 23 y.o.  at 38w4d here for RPNV.  She has no contractions, no bleeding, or no LOF. Reports goog normal fetal movement. Patient reports that she may not want her IOL scheduled in  and may want to move it to between -.       OBJECTIVE  Visit Vitals  /78   Wt 120 kg (264 lb)   BMI 40.14 kg/m²   OB Status Pregnant   Smoking Status Never   BSA 2.4 m²      ASSESSMENT & PLAN    Lavon Longo is a 23 y.o.  at 38w4d here for the following concerns we addressed today:    Problem List Items Addressed This Visit       H/O severe pre-eclampsia    History of gestational diabetes mellitus (GDM)    High risk pregnancy, antepartum    Overview     Datin week US uncertain LMP  [] Initial BMI:   [x] Prenatal Labs:   [] Genetic Screening:    [x] Baby ASA:  [] Anatomy US:  [x] 1hr GCT at 24-28wks- completing today  [x] Tdap (27-36wks): 10/24  [x] Flu Shot: declined  [] COVID vaccine:   [] Rhogam (if Rh neg):   [x] GBS at 36 wks: culture in progress, RN tasked to call lab to determine reason for result not processed yet  [x] Breastfeeding- yes   [x] Postpartum Birth control method: LARC  [] 39 weeks discussion of IOL vs. Expectant management:  [x] Mode of delivery:  IOL scheduled- we discussed with h./o shoulder- we cannot predict if this will happen again- however she is at greater risk due to this history and prolonging the pregnancy allows the fetus to increase in size. Discussed risks of PEC and return precautions.          Relevant Orders    Syphilis Screen with Reflex    H/O shoulder dystocia in prior pregnancy, currently pregnant    Overview     GDM in G1, patient unaware at the time, child with no deficits  Patient desires vaginal delivery          38 weeks gestation of pregnancy         Orders Placed This Encounter   Procedures    Syphilis Screen with Reflex     Standing Status:   Future     Number of Occurrences:   1     Standing  Expiration Date:   11/7/2024     Order Specific Question:   Release result to [employer]     Answer:   Immediate [1]      Seen and discussed with Dr. Rouse  RTC in 1 week or for IOL on 11/13  Emma Murray MD PGY-2    I saw and examined the patient.  I personally obtained the key and critical portions of the history and physical exam, and was physically present for the key and critical portions performed by the resident.  I reviewed the resident's documentation and discussed the patient with the resident.  I agreed with the resident's medical decision-making as documented in the resident's note.  Corinne Bazella MD

## 2023-11-08 LAB
GLUCOSE 1H P 50 G GLC PO SERPL-MCNC: 80 MG/DL
REFLEX ADDED, ANEMIA PANEL: NORMAL
T PALLIDUM AB SER QL: NONREACTIVE

## 2023-11-13 ENCOUNTER — ANESTHESIA (OUTPATIENT)
Dept: OBSTETRICS AND GYNECOLOGY | Facility: HOSPITAL | Age: 23
End: 2023-11-13
Payer: COMMERCIAL

## 2023-11-13 ENCOUNTER — ANESTHESIA EVENT (OUTPATIENT)
Dept: OBSTETRICS AND GYNECOLOGY | Facility: HOSPITAL | Age: 23
End: 2023-11-13
Payer: COMMERCIAL

## 2023-11-13 ENCOUNTER — APPOINTMENT (OUTPATIENT)
Dept: OBSTETRICS AND GYNECOLOGY | Facility: HOSPITAL | Age: 23
End: 2023-11-13
Payer: COMMERCIAL

## 2023-11-13 ENCOUNTER — HOSPITAL ENCOUNTER (INPATIENT)
Facility: HOSPITAL | Age: 23
LOS: 4 days | Discharge: HOME | End: 2023-11-17
Attending: OBSTETRICS & GYNECOLOGY | Admitting: OBSTETRICS & GYNECOLOGY
Payer: COMMERCIAL

## 2023-11-13 DIAGNOSIS — Z34.90 ENCOUNTER FOR INDUCTION OF LABOR (HHS-HCC): ICD-10-CM

## 2023-11-13 DIAGNOSIS — Z87.59 H/O SEVERE PRE-ECLAMPSIA: Primary | ICD-10-CM

## 2023-11-13 DIAGNOSIS — O13.9 GESTATIONAL HYPERTENSION, ANTEPARTUM (HHS-HCC): ICD-10-CM

## 2023-11-13 PROBLEM — K21.9 GASTROESOPHAGEAL REFLUX DISEASE WITHOUT ESOPHAGITIS: Status: ACTIVE | Noted: 2023-11-13

## 2023-11-13 LAB
ABO GROUP (TYPE) IN BLOOD: NORMAL
ANTIBODY SCREEN: NORMAL
ERYTHROCYTE [DISTWIDTH] IN BLOOD BY AUTOMATED COUNT: 13.4 % (ref 11.5–14.5)
HCT VFR BLD AUTO: 35.1 % (ref 36–46)
HGB BLD-MCNC: 12 G/DL (ref 12–16)
MCH RBC QN AUTO: 30.8 PG (ref 26–34)
MCHC RBC AUTO-ENTMCNC: 34.2 G/DL (ref 32–36)
MCV RBC AUTO: 90 FL (ref 80–100)
NRBC BLD-RTO: 0 /100 WBCS (ref 0–0)
PLATELET # BLD AUTO: 214 X10*3/UL (ref 150–450)
RBC # BLD AUTO: 3.9 X10*6/UL (ref 4–5.2)
RH FACTOR (ANTIGEN D): NORMAL
T PALLIDUM AB SER QL: NONREACTIVE
WBC # BLD AUTO: 11 X10*3/UL (ref 4.4–11.3)

## 2023-11-13 PROCEDURE — 3700000001 HC GENERAL ANESTHESIA TIME - INITIAL BASE CHARGE: Performed by: STUDENT IN AN ORGANIZED HEALTH CARE EDUCATION/TRAINING PROGRAM

## 2023-11-13 PROCEDURE — 2500000001 HC RX 250 WO HCPCS SELF ADMINISTERED DRUGS (ALT 637 FOR MEDICARE OP)

## 2023-11-13 PROCEDURE — 01967 NEURAXL LBR ANES VAG DLVR: CPT | Performed by: STUDENT IN AN ORGANIZED HEALTH CARE EDUCATION/TRAINING PROGRAM

## 2023-11-13 PROCEDURE — 1120000001 HC OB PRIVATE ROOM DAILY

## 2023-11-13 PROCEDURE — 10907ZC DRAINAGE OF AMNIOTIC FLUID, THERAPEUTIC FROM PRODUCTS OF CONCEPTION, VIA NATURAL OR ARTIFICIAL OPENING: ICD-10-PCS | Performed by: NURSE PRACTITIONER

## 2023-11-13 PROCEDURE — 2500000005 HC RX 250 GENERAL PHARMACY W/O HCPCS: Performed by: ANESTHESIOLOGIST ASSISTANT

## 2023-11-13 PROCEDURE — 3700000002 HC GENERAL ANESTHESIA TIME - EACH INCREMENTAL 1 MINUTE: Performed by: STUDENT IN AN ORGANIZED HEALTH CARE EDUCATION/TRAINING PROGRAM

## 2023-11-13 PROCEDURE — 85027 COMPLETE CBC AUTOMATED: CPT | Performed by: STUDENT IN AN ORGANIZED HEALTH CARE EDUCATION/TRAINING PROGRAM

## 2023-11-13 PROCEDURE — 86901 BLOOD TYPING SEROLOGIC RH(D): CPT

## 2023-11-13 PROCEDURE — 36415 COLL VENOUS BLD VENIPUNCTURE: CPT

## 2023-11-13 PROCEDURE — 85027 COMPLETE CBC AUTOMATED: CPT

## 2023-11-13 PROCEDURE — 2500000004 HC RX 250 GENERAL PHARMACY W/ HCPCS (ALT 636 FOR OP/ED)

## 2023-11-13 PROCEDURE — 80053 COMPREHEN METABOLIC PANEL: CPT | Performed by: STUDENT IN AN ORGANIZED HEALTH CARE EDUCATION/TRAINING PROGRAM

## 2023-11-13 PROCEDURE — 01967 NEURAXL LBR ANES VAG DLVR: CPT | Performed by: ANESTHESIOLOGIST ASSISTANT

## 2023-11-13 PROCEDURE — 86780 TREPONEMA PALLIDUM: CPT

## 2023-11-13 PROCEDURE — 3E0P7VZ INTRODUCTION OF HORMONE INTO FEMALE REPRODUCTIVE, VIA NATURAL OR ARTIFICIAL OPENING: ICD-10-PCS | Performed by: NURSE PRACTITIONER

## 2023-11-13 RX ORDER — SODIUM CHLORIDE, SODIUM LACTATE, POTASSIUM CHLORIDE, CALCIUM CHLORIDE 600; 310; 30; 20 MG/100ML; MG/100ML; MG/100ML; MG/100ML
125 INJECTION, SOLUTION INTRAVENOUS CONTINUOUS
Status: DISCONTINUED | OUTPATIENT
Start: 2023-11-13 | End: 2023-11-14

## 2023-11-13 RX ORDER — OXYTOCIN/0.9 % SODIUM CHLORIDE 30/500 ML
60 PLASTIC BAG, INJECTION (ML) INTRAVENOUS ONCE AS NEEDED
Status: COMPLETED | OUTPATIENT
Start: 2023-11-13 | End: 2023-11-14

## 2023-11-13 RX ORDER — FENTANYL/BUPIVACAINE/NS/PF 2MCG/ML-.1
PLASTIC BAG, INJECTION (ML) INJECTION CONTINUOUS PRN
Status: DISCONTINUED | OUTPATIENT
Start: 2023-11-13 | End: 2023-11-14

## 2023-11-13 RX ORDER — HYDRALAZINE HYDROCHLORIDE 20 MG/ML
5 INJECTION INTRAMUSCULAR; INTRAVENOUS ONCE AS NEEDED
Status: DISCONTINUED | OUTPATIENT
Start: 2023-11-13 | End: 2023-11-14

## 2023-11-13 RX ORDER — DIPHENHYDRAMINE HYDROCHLORIDE 50 MG/ML
25 INJECTION INTRAMUSCULAR; INTRAVENOUS ONCE
Status: COMPLETED | OUTPATIENT
Start: 2023-11-13 | End: 2023-11-13

## 2023-11-13 RX ORDER — LABETALOL HYDROCHLORIDE 5 MG/ML
20 INJECTION, SOLUTION INTRAVENOUS ONCE AS NEEDED
Status: DISCONTINUED | OUTPATIENT
Start: 2023-11-13 | End: 2023-11-14

## 2023-11-13 RX ORDER — PENICILLIN G 3000000 [IU]/50ML
3 INJECTION, SOLUTION INTRAVENOUS EVERY 4 HOURS
Status: DISCONTINUED | OUTPATIENT
Start: 2023-11-13 | End: 2023-11-14

## 2023-11-13 RX ORDER — OXYTOCIN 10 [USP'U]/ML
10 INJECTION, SOLUTION INTRAMUSCULAR; INTRAVENOUS ONCE AS NEEDED
Status: DISCONTINUED | OUTPATIENT
Start: 2023-11-13 | End: 2023-11-14

## 2023-11-13 RX ORDER — CARBOPROST TROMETHAMINE 250 UG/ML
250 INJECTION, SOLUTION INTRAMUSCULAR ONCE AS NEEDED
Status: COMPLETED | OUTPATIENT
Start: 2023-11-13 | End: 2023-11-14

## 2023-11-13 RX ORDER — FENTANYL/BUPIVACAINE/NS/PF 2MCG/ML-.1
PLASTIC BAG, INJECTION (ML) INJECTION AS NEEDED
Status: DISCONTINUED | OUTPATIENT
Start: 2023-11-13 | End: 2023-11-14

## 2023-11-13 RX ORDER — NIFEDIPINE 10 MG/1
10 CAPSULE ORAL ONCE AS NEEDED
Status: DISCONTINUED | OUTPATIENT
Start: 2023-11-13 | End: 2023-11-14

## 2023-11-13 RX ORDER — ONDANSETRON 4 MG/1
4 TABLET, FILM COATED ORAL EVERY 6 HOURS PRN
Status: DISCONTINUED | OUTPATIENT
Start: 2023-11-13 | End: 2023-11-14

## 2023-11-13 RX ORDER — LOPERAMIDE HYDROCHLORIDE 2 MG/1
4 CAPSULE ORAL EVERY 2 HOUR PRN
Status: DISCONTINUED | OUTPATIENT
Start: 2023-11-13 | End: 2023-11-14

## 2023-11-13 RX ORDER — ONDANSETRON HYDROCHLORIDE 2 MG/ML
4 INJECTION, SOLUTION INTRAVENOUS EVERY 6 HOURS PRN
Status: DISCONTINUED | OUTPATIENT
Start: 2023-11-13 | End: 2023-11-14

## 2023-11-13 RX ORDER — METHYLERGONOVINE MALEATE 0.2 MG/ML
0.2 INJECTION INTRAVENOUS ONCE AS NEEDED
Status: DISCONTINUED | OUTPATIENT
Start: 2023-11-13 | End: 2023-11-14

## 2023-11-13 RX ORDER — TRANEXAMIC ACID 100 MG/ML
1000 INJECTION, SOLUTION INTRAVENOUS ONCE AS NEEDED
Status: DISCONTINUED | OUTPATIENT
Start: 2023-11-13 | End: 2023-11-14

## 2023-11-13 RX ORDER — OXYTOCIN/0.9 % SODIUM CHLORIDE 30/500 ML
2-30 PLASTIC BAG, INJECTION (ML) INTRAVENOUS CONTINUOUS
Status: DISCONTINUED | OUTPATIENT
Start: 2023-11-13 | End: 2023-11-14

## 2023-11-13 RX ORDER — TERBUTALINE SULFATE 1 MG/ML
0.25 INJECTION SUBCUTANEOUS ONCE AS NEEDED
Status: DISCONTINUED | OUTPATIENT
Start: 2023-11-13 | End: 2023-11-14

## 2023-11-13 RX ORDER — FENTANYL/BUPIVACAINE/NS/PF 2MCG/ML-.1
0-54 PLASTIC BAG, INJECTION (ML) INJECTION CONTINUOUS
Status: DISCONTINUED | OUTPATIENT
Start: 2023-11-13 | End: 2023-11-14

## 2023-11-13 RX ORDER — MISOPROSTOL 200 UG/1
800 TABLET ORAL ONCE AS NEEDED
Status: COMPLETED | OUTPATIENT
Start: 2023-11-13 | End: 2023-11-14

## 2023-11-13 RX ORDER — METOCLOPRAMIDE HYDROCHLORIDE 5 MG/ML
10 INJECTION INTRAMUSCULAR; INTRAVENOUS EVERY 6 HOURS PRN
Status: DISCONTINUED | OUTPATIENT
Start: 2023-11-13 | End: 2023-11-14

## 2023-11-13 RX ORDER — FENTANYL CITRATE 50 UG/ML
50 INJECTION, SOLUTION INTRAMUSCULAR; INTRAVENOUS ONCE
Status: COMPLETED | OUTPATIENT
Start: 2023-11-13 | End: 2023-11-13

## 2023-11-13 RX ORDER — METOCLOPRAMIDE 10 MG/1
10 TABLET ORAL EVERY 6 HOURS PRN
Status: DISCONTINUED | OUTPATIENT
Start: 2023-11-13 | End: 2023-11-14

## 2023-11-13 RX ORDER — LIDOCAINE HYDROCHLORIDE 10 MG/ML
30 INJECTION INFILTRATION; PERINEURAL ONCE AS NEEDED
Status: DISCONTINUED | OUTPATIENT
Start: 2023-11-13 | End: 2023-11-14

## 2023-11-13 RX ADMIN — Medication 5 ML: at 22:13

## 2023-11-13 RX ADMIN — SODIUM CHLORIDE, POTASSIUM CHLORIDE, SODIUM LACTATE AND CALCIUM CHLORIDE 125 ML/HR: 600; 310; 30; 20 INJECTION, SOLUTION INTRAVENOUS at 23:26

## 2023-11-13 RX ADMIN — ONDANSETRON 4 MG: 2 INJECTION INTRAMUSCULAR; INTRAVENOUS at 23:27

## 2023-11-13 RX ADMIN — Medication 5 ML: at 22:10

## 2023-11-13 RX ADMIN — MISOPROSTOL 25 MCG: 100 TABLET ORAL at 21:17

## 2023-11-13 RX ADMIN — Medication 14 ML/HR: at 22:14

## 2023-11-13 RX ADMIN — FENTANYL CITRATE 50 MCG: 50 INJECTION INTRAMUSCULAR; INTRAVENOUS at 17:52

## 2023-11-13 RX ADMIN — PENICILLIN G POTASSIUM 5 MILLION UNITS: 5000000 POWDER, FOR SOLUTION INTRAMUSCULAR; INTRAPLEURAL; INTRATHECAL; INTRAVENOUS at 17:51

## 2023-11-13 RX ADMIN — DIPHENHYDRAMINE HYDROCHLORIDE 25 MG: 50 INJECTION INTRAMUSCULAR; INTRAVENOUS at 23:27

## 2023-11-13 RX ADMIN — PENICILLIN G 3 MILLION UNITS: 3000000 INJECTION, SOLUTION INTRAVENOUS at 22:28

## 2023-11-13 RX ADMIN — MISOPROSTOL 25 MCG: 100 TABLET ORAL at 17:52

## 2023-11-13 SDOH — HEALTH STABILITY: MENTAL HEALTH: HOW OFTEN DO YOU HAVE A DRINK CONTAINING ALCOHOL?: NEVER

## 2023-11-13 SDOH — ECONOMIC STABILITY: FOOD INSECURITY: WITHIN THE PAST 12 MONTHS, YOU WORRIED THAT YOUR FOOD WOULD RUN OUT BEFORE YOU GOT MONEY TO BUY MORE.: NEVER TRUE

## 2023-11-13 SDOH — SOCIAL STABILITY: SOCIAL INSECURITY
WITHIN THE LAST YEAR, HAVE TO BEEN RAPED OR FORCED TO HAVE ANY KIND OF SEXUAL ACTIVITY BY YOUR PARTNER OR EX-PARTNER?: NO

## 2023-11-13 SDOH — HEALTH STABILITY: MENTAL HEALTH
STRESS IS WHEN SOMEONE FEELS TENSE, NERVOUS, ANXIOUS, OR CAN'T SLEEP AT NIGHT BECAUSE THEIR MIND IS TROUBLED. HOW STRESSED ARE YOU?: ONLY A LITTLE

## 2023-11-13 SDOH — SOCIAL STABILITY: SOCIAL INSECURITY: HAS ANYONE EVER THREATENED TO HURT YOUR FAMILY OR YOUR PETS?: NO

## 2023-11-13 SDOH — HEALTH STABILITY: MENTAL HEALTH: HOW OFTEN DO YOU HAVE 6 OR MORE DRINKS ON ONE OCCASION?: NEVER

## 2023-11-13 SDOH — SOCIAL STABILITY: SOCIAL INSECURITY: PHYSICAL ABUSE: DENIES

## 2023-11-13 SDOH — SOCIAL STABILITY: SOCIAL INSECURITY: ABUSE SCREEN: ADULT

## 2023-11-13 SDOH — SOCIAL STABILITY: SOCIAL INSECURITY: DO YOU FEEL ANYONE HAS EXPLOITED OR TAKEN ADVANTAGE OF YOU FINANCIALLY OR OF YOUR PERSONAL PROPERTY?: NO

## 2023-11-13 SDOH — ECONOMIC STABILITY: HOUSING INSECURITY: DO YOU FEEL UNSAFE GOING BACK TO THE PLACE WHERE YOU ARE LIVING?: NO

## 2023-11-13 SDOH — SOCIAL STABILITY: SOCIAL INSECURITY: WITHIN THE LAST YEAR, HAVE YOU BEEN AFRAID OF YOUR PARTNER OR EX-PARTNER?: NO

## 2023-11-13 SDOH — SOCIAL STABILITY: SOCIAL INSECURITY: ARE THERE ANY APPARENT SIGNS OF INJURIES/BEHAVIORS THAT COULD BE RELATED TO ABUSE/NEGLECT?: NO

## 2023-11-13 SDOH — SOCIAL STABILITY: SOCIAL NETWORK: ARE YOU MARRIED, WIDOWED, DIVORCED, SEPARATED, NEVER MARRIED, OR LIVING WITH A PARTNER?: NEVER MARRIED

## 2023-11-13 SDOH — SOCIAL STABILITY: SOCIAL INSECURITY: HAVE YOU HAD THOUGHTS OF HARMING ANYONE ELSE?: NO

## 2023-11-13 SDOH — SOCIAL STABILITY: SOCIAL INSECURITY
WITHIN THE LAST YEAR, HAVE YOU BEEN KICKED, HIT, SLAPPED, OR OTHERWISE PHYSICALLY HURT BY YOUR PARTNER OR EX-PARTNER?: NO

## 2023-11-13 SDOH — ECONOMIC STABILITY: FOOD INSECURITY: WITHIN THE PAST 12 MONTHS, THE FOOD YOU BOUGHT JUST DIDN'T LAST AND YOU DIDN'T HAVE MONEY TO GET MORE.: NEVER TRUE

## 2023-11-13 SDOH — HEALTH STABILITY: MENTAL HEALTH: CURRENT SMOKER: 0

## 2023-11-13 SDOH — SOCIAL STABILITY: SOCIAL INSECURITY: WITHIN THE LAST YEAR, HAVE YOU BEEN HUMILIATED OR EMOTIONALLY ABUSED IN OTHER WAYS BY YOUR PARTNER OR EX-PARTNER?: NO

## 2023-11-13 SDOH — HEALTH STABILITY: MENTAL HEALTH: WISH TO BE DEAD (PAST 1 MONTH): NO

## 2023-11-13 SDOH — HEALTH STABILITY: MENTAL HEALTH: SUICIDAL BEHAVIOR (LIFETIME): NO

## 2023-11-13 SDOH — HEALTH STABILITY: MENTAL HEALTH: HOW MANY STANDARD DRINKS CONTAINING ALCOHOL DO YOU HAVE ON A TYPICAL DAY?: PATIENT DOES NOT DRINK

## 2023-11-13 SDOH — HEALTH STABILITY: MENTAL HEALTH: NON-SPECIFIC ACTIVE SUICIDAL THOUGHTS (PAST 1 MONTH): NO

## 2023-11-13 SDOH — SOCIAL STABILITY: SOCIAL NETWORK: HOW OFTEN DO YOU GET TOGETHER WITH FRIENDS OR RELATIVES?: MORE THAN THREE TIMES A WEEK

## 2023-11-13 SDOH — SOCIAL STABILITY: SOCIAL NETWORK: IN A TYPICAL WEEK, HOW MANY TIMES DO YOU TALK ON THE PHONE WITH FAMILY, FRIENDS, OR NEIGHBORS?: TWICE A WEEK

## 2023-11-13 SDOH — SOCIAL STABILITY: SOCIAL NETWORK
DO YOU BELONG TO ANY CLUBS OR ORGANIZATIONS SUCH AS CHURCH GROUPS UNIONS, FRATERNAL OR ATHLETIC GROUPS, OR SCHOOL GROUPS?: NO

## 2023-11-13 SDOH — SOCIAL STABILITY: SOCIAL INSECURITY: ARE YOU OR HAVE YOU BEEN THREATENED OR ABUSED PHYSICALLY, EMOTIONALLY, OR SEXUALLY BY ANYONE?: NO

## 2023-11-13 SDOH — ECONOMIC STABILITY: TRANSPORTATION INSECURITY
IN THE PAST 12 MONTHS, HAS THE LACK OF TRANSPORTATION KEPT YOU FROM MEDICAL APPOINTMENTS OR FROM GETTING MEDICATIONS?: NO

## 2023-11-13 SDOH — SOCIAL STABILITY: SOCIAL NETWORK: HOW OFTEN DO YOU ATTEND CHURCH OR RELIGIOUS SERVICES?: 1 TO 4 TIMES PER YEAR

## 2023-11-13 SDOH — ECONOMIC STABILITY: TRANSPORTATION INSECURITY
IN THE PAST 12 MONTHS, HAS LACK OF TRANSPORTATION KEPT YOU FROM MEETINGS, WORK, OR FROM GETTING THINGS NEEDED FOR DAILY LIVING?: NO

## 2023-11-13 SDOH — HEALTH STABILITY: MENTAL HEALTH: HAVE YOU USED ANY SUBSTANCES (CANABIS, COCAINE, HEROIN, HALLUCINOGENS, INHALANTS, ETC.) IN THE PAST 12 MONTHS?: NO

## 2023-11-13 SDOH — HEALTH STABILITY: MENTAL HEALTH: HAVE YOU USED ANY PRESCRIPTION DRUGS OTHER THAN PRESCRIBED IN THE PAST 12 MONTHS?: NO

## 2023-11-13 SDOH — SOCIAL STABILITY: SOCIAL NETWORK: HOW OFTEN DO YOU ATTENT MEETINGS OF THE CLUB OR ORGANIZATION YOU BELONG TO?: NEVER

## 2023-11-13 SDOH — SOCIAL STABILITY: SOCIAL INSECURITY: VERBAL ABUSE: DENIES

## 2023-11-13 SDOH — SOCIAL STABILITY: SOCIAL INSECURITY: DOES ANYONE TRY TO KEEP YOU FROM HAVING/CONTACTING OTHER FRIENDS OR DOING THINGS OUTSIDE YOUR HOME?: NO

## 2023-11-13 ASSESSMENT — LIFESTYLE VARIABLES
SKIP TO QUESTIONS 9-10: 1
HOW OFTEN DO YOU HAVE 6 OR MORE DRINKS ON ONE OCCASION: NEVER
HOW OFTEN DO YOU HAVE A DRINK CONTAINING ALCOHOL: NEVER
AUDIT-C TOTAL SCORE: 0
HOW MANY STANDARD DRINKS CONTAINING ALCOHOL DO YOU HAVE ON A TYPICAL DAY: PATIENT DOES NOT DRINK
SKIP TO QUESTIONS 9-10: 1

## 2023-11-13 ASSESSMENT — PAIN DESCRIPTION - LOCATION: LOCATION: VAGINA

## 2023-11-13 ASSESSMENT — PAIN SCALES - GENERAL
PAINLEVEL_OUTOF10: 7
PAINLEVEL_OUTOF10: 0 - NO PAIN
PAINLEVEL_OUTOF10: 0 - NO PAIN
PAIN_LEVEL: 0
PAINLEVEL_OUTOF10: 0 - NO PAIN

## 2023-11-13 ASSESSMENT — ACTIVITIES OF DAILY LIVING (ADL): LACK_OF_TRANSPORTATION: NO

## 2023-11-13 ASSESSMENT — PATIENT HEALTH QUESTIONNAIRE - PHQ9
SUM OF ALL RESPONSES TO PHQ9 QUESTIONS 1 & 2: 0
2. FEELING DOWN, DEPRESSED OR HOPELESS: NOT AT ALL
1. LITTLE INTEREST OR PLEASURE IN DOING THINGS: NOT AT ALL

## 2023-11-13 NOTE — H&P
Obstetrical Admission History and Physical     Lavon Longo is a 23 y.o.  at 39w3d. ELVIS: 2023, by Ultrasound. Estimated fetal weight: 3350. She has had prenatal care with [facility] .    Chief Complaint: Scheduled Induction    Assessment/Plan      Principal Problem:    Labor and delivery indication for care or intervention  Active Problems:    H/O severe pre-eclampsia    H/O shoulder dystocia in prior pregnancy, currently pregnant      Pregnancy Problems (from 10/20/23 to present)       Problem Noted Resolved    Labor and delivery indication for care or intervention 2023 by Hodan Alonso MD No    Priority:  Medium      Overview Signed 2023  4:40 PM by Hodan Alonso MD     PNLs reviewed, wnl  GBS pos, pcn ppx  ppBC: ppIUD  Admitted, consented, scanned - cephalic         38 weeks gestation of pregnancy 10/24/2023 by Corinne A Bazella, MD No    Priority:  Medium      H/O severe pre-eclampsia 10/20/2023 by Tanya Ayon MD No    Priority:  Medium      Overview Signed 2023  4:39 PM by Hodan Alonso MD     Normotensive this pregnancy  No PEC symptoms                     High risk pregnancy, antepartum 10/20/2023 by Tanya Ayon MD No    Priority:  Medium      Overview Addendum 2023  1:43 PM by Emma Murray MD     Datin week US uncertain LMP  [] Initial BMI:   [x] Prenatal Labs:   [] Genetic Screening:    [x] Baby ASA:  [] Anatomy US:  [x] 1hr GCT at 24-28wks- completing today  [x] Tdap (27-36wks): 10/24  [x] Flu Shot: declined  [] COVID vaccine:   [] Rhogam (if Rh neg):   [x] GBS at 36 wks: positive 10/25 at [facility]  [x] Breastfeeding- yes   [x] Postpartum Birth control method: LARC  [] 39 weeks discussion of IOL vs. Expectant management:  [x] Mode of delivery:  IOL scheduled- we discussed with h./o shoulder- we cannot predict if this will happen again- however she is at greater risk due to this history and prolonging the pregnancy allows the fetus to increase in size.  Discussed risks of PEC and return precautions.          H/O shoulder dystocia in prior pregnancy, currently pregnant 10/20/2023 by Tanya Ayon MD No    Priority:  Medium      Overview Addendum 11/13/2023  4:39 PM by Hodan Alonso MD     GDM in G1, patient unaware at the time, child with no deficits - birthweight 6#8.   The patient was counseled extensively on the risks of a shoulder dystocia, including the maternal risks of perineal lacerations, pubic symphysis dislocations, episiotomy and the fetal risks of brachial plexus injuries, clavicular/humeral fractures, HIE, and death. We discussed the different maneuvers that we would use and the indications for a CD including NRFHTS, arrest of dilation or arrest of descent. The patient understands the risks and  desires to proceed with the IOL.           Obesity 8/10/2023 by Giovana Garcia No    Priority:  Medium         Options for delivery have been discussed with the patient and she elects for an induction of labor.  Cervical ripening with cytotec, cervidil, other prostaglandin agents has been discussed.  Induction of labor with pitocin, amniotomy, cytotec, and cervical balloon have been discussed in detail. The risks, benefits, complications, alternatives, expected outcomes, potential problems during recuperation and recovery, and the risks of not performing the procedure were discussed with the patient. The patient stated understanding that the risks of delivery include, but are not limited to: death; reaction to medications; injury to bowel, bladder, ureters, uterus, cervix, vagina, and other pelvic and abdominal structures, infection; blood loss and possible need for transfusion; and potential need for surgery, including hysterectomy. The risks of injury to the infant during delivery were also discussed. All questions were answered. There was concurrence with the planned procedure, and the patient wanted to proceed.    Admit to inpatient status. I anticipate  that this patient will require a stay exceeding at least 2 midnights for delivery and postpartum.  Induction of labor with CRB and cyto  Management of pregnancy complications, as indicated.  GBS negative no prophylaxis needed  I saw and evaluated the patient. I personally obtained the key and critical portions of the history and physical exam or was physically present for key and critical portions performed by the resident/fellow. I reviewed the resident/fellow's documentation and discussed the patient with the resident/fellow. I agree with the resident/fellow's medical decision making as documented in the note.  Michelle Jaffe MD      Pt seen and discussed with Dr. Ld Alonso MD PGY-1      Subjective   Good fetal movement. Denies vaginal bleeding.     Reason for Induction of Labor:  Pregnancy at 39 weeks or greater for induction       Obstetrical History   OB History    Para Term  AB Living   2 1 0 1 0 1   SAB IAB Ectopic Multiple Live Births   0 0 0 0 1      # Outcome Date GA Lbr [place]/2nd Weight Sex Delivery Anes PTL Lv   2 Current            1  22 36w0d  2920 g M Vag-Spont   LUCIANO      Complications: Shoulder Dystocia       Past Medical History  No past medical history on file.     Past Surgical History   No past surgical history on file.    Social History  Social History     Tobacco Use    Smoking status: Never    Smokeless tobacco: Never   Substance Use Topics    Alcohol use: Not Currently     Substance and Sexual Activity   Drug Use Never       Allergies  Patient has no allergy information on record.     Medications  Medications Prior to Admission   Medication Sig Dispense Refill Last Dose    aspirin 81 mg EC tablet TAKE 2 TABLET DAILY 60 tablet 6     lancets misc 4 x day       prenatal vitamin, iron-folic, 27 mg iron-800 mcg folic acid tablet TAKE 1 TABLET BY MOUTH ONCE DAILY 30 tablet 11 11/10/2023    Pv w-o Vit A/ferrous fum/folic (PRECARE PO) Take 1 tablet by  mouth once daily.          Objective    Last Vitals  Temp Pulse Resp BP MAP O2 Sat   36.7 °C (98.1 °F)  (probe not on patients finger) 16 126/60   98 %     Physical Examination  SVE: 1/50/-3  FHT: cat 1 135/moderate/+accels/-decels  TOCO: irregular     Lab Review  Admission labs pending

## 2023-11-13 NOTE — PROGRESS NOTES
Intrapartum Progress Note    Assessment/Plan   Lavon Longo is a 23 y.o.  at 39w3d. ELVIS: 2023, by Ultrasound. Presenting for term IOL       Principal Problem:    Labor and delivery indication for care or intervention  Active Problems:    H/O severe pre-eclampsia    H/O shoulder dystocia in prior pregnancy, currently pregnant    Gastroesophageal reflux disease without esophagitis    IOL   -  CRB and cytotec #1 placed 1815  - for pitocin and AROM when appropriate     Hx of sPEC  - Normotensive this pregnancy  - No PEC sxs     Hx of Shoulder dystocia  - EFW 3350 gr     Maternal wellbeing  - Hb 11.7  - PPBC: LARC    Fetal wellbeing  - CEFM cat 1  - GBS pos PCN         Subjective   Pt sitting comfortably in bed. CRB placement discussed. Pt tolerated procedure well, denies pain, good fetal movement, no ctx yet.    Objective   Last Vitals:  Temp Pulse Resp BP MAP Pulse Ox   36.7 °C (98.1 °F)  (probe not on patients finger) 16 126/60   98 %     Vitals Min/Max Last 24 Hours:  Temp  Min: 36.6 °C (97.9 °F)  Max: 36.7 °C (98.1 °F)  Pulse  Min: 90  Max: 94  Resp  Min: 16  Max: 16  BP  Min: 126/60  Max: 126/60    Intake/Output:  No intake or output data in the 24 hours ending 23 1832    Physical Examination:  GENERAL: Examination reveals a well developed, well nourished, gravid female in no acute distress. She is alert and cooperative.  NECK: supple, no significant adenopathy  LUNGS:  normal respiratory effort on room air  FHR baseline is 140 , with moderate variability, +accels, no decels , and a Cat 1  tracing.    [place] reading:  quiet  CERVIX: 1 cm dilated, 50 % effaced, -2 station; MEMBRANES are  intact on admission  SKIN: normal coloration and turgor, no rashes  PSYCHOLOGICAL: awake and alert; oriented to person, place, and time    Seen and d/w Dr. Roddy Mccormack MD PGY1

## 2023-11-13 NOTE — ANESTHESIA PREPROCEDURE EVALUATION
"Patient: Lavon Longo    Evaluation Method: In-person visit    Procedure Information    Date: 11/13/23  Procedure: Labor Consult         [unfilled]    Clinical information reviewed:    Allergies  Meds               NPO Detail:  NPO/Void Status  Date of Last Solid:  (pt currently eating)         OB/Gyn Evaluation    Present Pregnancy    Patient is pregnant now.   Obstetric History                Physical Exam    Airway  Mallampati: III  TM distance: >3 FB  Neck ROM: full     Cardiovascular   Rhythm: regular  Rate: normal     Dental - normal exam     Pulmonary   Breath sounds clear to auscultation     Abdominal   (+) obese         Epidural on 5/11/2022; 1 attempt, DMITRY @ 8.5cm.   Pt states there was a problem with her epidural catheter during previous L&D. States it did not work properly and she was \"feeling things I wasn't supposed to be feeling.\" Ipro record stated there was a concern that the catheter became disconnected. Pt states that her epidural catheter was replaced and new catheter worked well.     Noted no second epidural documented in previous labor analgesia record. Sk, aprn-crna    Anesthesia Plan    ASA 2     epidural     The patient is not a current smoker.  Patient did not smoke on day of procedure.    Anesthetic plan and risks discussed with patient.  Use of blood products discussed with patient who consented to blood products.    Plan discussed with CRNA.      Vitals:    11/13/23 1833   BP: 135/69   Pulse: 86   Resp: 16   Temp: 36.7 °C (98.1 °F)   SpO2:         "

## 2023-11-14 PROBLEM — O13.9 GESTATIONAL HYPERTENSION, ANTEPARTUM (HHS-HCC): Status: ACTIVE | Noted: 2023-10-19

## 2023-11-14 PROBLEM — O09.299 H/O SHOULDER DYSTOCIA IN PRIOR PREGNANCY, CURRENTLY PREGNANT (HHS-HCC): Status: RESOLVED | Noted: 2023-10-20 | Resolved: 2023-11-14

## 2023-11-14 LAB
ALBUMIN SERPL BCP-MCNC: 3.6 G/DL (ref 3.4–5)
ALP SERPL-CCNC: 139 U/L (ref 33–110)
ALT SERPL W P-5'-P-CCNC: 11 U/L (ref 7–45)
ANION GAP SERPL CALC-SCNC: 13 MMOL/L (ref 10–20)
AST SERPL W P-5'-P-CCNC: 17 U/L (ref 9–39)
BILIRUB SERPL-MCNC: 0.2 MG/DL (ref 0–1.2)
BUN SERPL-MCNC: 9 MG/DL (ref 6–23)
CALCIUM SERPL-MCNC: 8.6 MG/DL (ref 8.6–10.6)
CHLORIDE SERPL-SCNC: 101 MMOL/L (ref 98–107)
CO2 SERPL-SCNC: 26 MMOL/L (ref 21–32)
CREAT SERPL-MCNC: 0.58 MG/DL (ref 0.5–1.05)
ERYTHROCYTE [DISTWIDTH] IN BLOOD BY AUTOMATED COUNT: 13.4 % (ref 11.5–14.5)
ERYTHROCYTE [DISTWIDTH] IN BLOOD BY AUTOMATED COUNT: 13.6 % (ref 11.5–14.5)
GFR SERPL CREATININE-BSD FRML MDRD: >90 ML/MIN/1.73M*2
GLUCOSE SERPL-MCNC: 82 MG/DL (ref 74–99)
HCT VFR BLD AUTO: 35.2 % (ref 36–46)
HCT VFR BLD AUTO: 38.5 % (ref 36–46)
HGB BLD-MCNC: 11.7 G/DL (ref 12–16)
HGB BLD-MCNC: 12.7 G/DL (ref 12–16)
MCH RBC QN AUTO: 29.9 PG (ref 26–34)
MCH RBC QN AUTO: 30.2 PG (ref 26–34)
MCHC RBC AUTO-ENTMCNC: 33 G/DL (ref 32–36)
MCHC RBC AUTO-ENTMCNC: 33.2 G/DL (ref 32–36)
MCV RBC AUTO: 90 FL (ref 80–100)
MCV RBC AUTO: 91 FL (ref 80–100)
NRBC BLD-RTO: 0 /100 WBCS (ref 0–0)
NRBC BLD-RTO: 0 /100 WBCS (ref 0–0)
PLATELET # BLD AUTO: 211 X10*3/UL (ref 150–450)
PLATELET # BLD AUTO: 231 X10*3/UL (ref 150–450)
POTASSIUM SERPL-SCNC: 3.9 MMOL/L (ref 3.5–5.3)
PROT SERPL-MCNC: 6.3 G/DL (ref 6.4–8.2)
RBC # BLD AUTO: 3.91 X10*6/UL (ref 4–5.2)
RBC # BLD AUTO: 4.21 X10*6/UL (ref 4–5.2)
SODIUM SERPL-SCNC: 136 MMOL/L (ref 136–145)
WBC # BLD AUTO: 11.7 X10*3/UL (ref 4.4–11.3)
WBC # BLD AUTO: 15.1 X10*3/UL (ref 4.4–11.3)

## 2023-11-14 PROCEDURE — 2500000005 HC RX 250 GENERAL PHARMACY W/O HCPCS

## 2023-11-14 PROCEDURE — 85027 COMPLETE CBC AUTOMATED: CPT | Performed by: NURSE PRACTITIONER

## 2023-11-14 PROCEDURE — 51701 INSERT BLADDER CATHETER: CPT

## 2023-11-14 PROCEDURE — 1210000001 HC SEMI-PRIVATE ROOM DAILY

## 2023-11-14 PROCEDURE — 2500000001 HC RX 250 WO HCPCS SELF ADMINISTERED DRUGS (ALT 637 FOR MEDICARE OP)

## 2023-11-14 PROCEDURE — 2500000004 HC RX 250 GENERAL PHARMACY W/ HCPCS (ALT 636 FOR OP/ED): Performed by: NURSE PRACTITIONER

## 2023-11-14 PROCEDURE — 3E033VJ INTRODUCTION OF OTHER HORMONE INTO PERIPHERAL VEIN, PERCUTANEOUS APPROACH: ICD-10-PCS | Performed by: NURSE PRACTITIONER

## 2023-11-14 PROCEDURE — 96372 THER/PROPH/DIAG INJ SC/IM: CPT

## 2023-11-14 PROCEDURE — 2500000004 HC RX 250 GENERAL PHARMACY W/ HCPCS (ALT 636 FOR OP/ED): Performed by: STUDENT IN AN ORGANIZED HEALTH CARE EDUCATION/TRAINING PROGRAM

## 2023-11-14 PROCEDURE — 2500000004 HC RX 250 GENERAL PHARMACY W/ HCPCS (ALT 636 FOR OP/ED)

## 2023-11-14 PROCEDURE — 59410 OBSTETRICAL CARE: CPT | Performed by: STUDENT IN AN ORGANIZED HEALTH CARE EDUCATION/TRAINING PROGRAM

## 2023-11-14 PROCEDURE — 59409 OBSTETRICAL CARE: CPT | Performed by: STUDENT IN AN ORGANIZED HEALTH CARE EDUCATION/TRAINING PROGRAM

## 2023-11-14 RX ORDER — DIPHENHYDRAMINE HCL 25 MG
25 CAPSULE ORAL EVERY 6 HOURS PRN
Status: DISCONTINUED | OUTPATIENT
Start: 2023-11-14 | End: 2023-11-17 | Stop reason: HOSPADM

## 2023-11-14 RX ORDER — OXYTOCIN 10 [USP'U]/ML
10 INJECTION, SOLUTION INTRAMUSCULAR; INTRAVENOUS ONCE AS NEEDED
Status: DISCONTINUED | OUTPATIENT
Start: 2023-11-14 | End: 2023-11-17 | Stop reason: HOSPADM

## 2023-11-14 RX ORDER — SIMETHICONE 80 MG
80 TABLET,CHEWABLE ORAL 4 TIMES DAILY PRN
Status: DISCONTINUED | OUTPATIENT
Start: 2023-11-14 | End: 2023-11-17 | Stop reason: HOSPADM

## 2023-11-14 RX ORDER — NEBULIZER AND COMPRESSOR
1 EACH MISCELLANEOUS ONCE
Qty: 1 EACH | Refills: 0 | Status: SHIPPED | OUTPATIENT
Start: 2023-11-14 | End: 2023-11-14

## 2023-11-14 RX ORDER — NIFEDIPINE 30 MG/1
30 TABLET, FILM COATED, EXTENDED RELEASE ORAL NIGHTLY
Status: DISCONTINUED | OUTPATIENT
Start: 2023-11-14 | End: 2023-11-15

## 2023-11-14 RX ORDER — CARBOPROST TROMETHAMINE 250 UG/ML
250 INJECTION, SOLUTION INTRAMUSCULAR ONCE AS NEEDED
Status: DISCONTINUED | OUTPATIENT
Start: 2023-11-14 | End: 2023-11-17 | Stop reason: HOSPADM

## 2023-11-14 RX ORDER — OXYCODONE HYDROCHLORIDE 5 MG/1
5 TABLET ORAL ONCE
Status: COMPLETED | OUTPATIENT
Start: 2023-11-14 | End: 2023-11-14

## 2023-11-14 RX ORDER — OXYTOCIN/0.9 % SODIUM CHLORIDE 30/500 ML
60 PLASTIC BAG, INJECTION (ML) INTRAVENOUS ONCE AS NEEDED
Status: DISCONTINUED | OUTPATIENT
Start: 2023-11-14 | End: 2023-11-17 | Stop reason: HOSPADM

## 2023-11-14 RX ORDER — LABETALOL HYDROCHLORIDE 5 MG/ML
20 INJECTION, SOLUTION INTRAVENOUS ONCE AS NEEDED
Status: DISCONTINUED | OUTPATIENT
Start: 2023-11-14 | End: 2023-11-17 | Stop reason: HOSPADM

## 2023-11-14 RX ORDER — HYDRALAZINE HYDROCHLORIDE 20 MG/ML
5 INJECTION INTRAMUSCULAR; INTRAVENOUS ONCE AS NEEDED
Status: DISCONTINUED | OUTPATIENT
Start: 2023-11-14 | End: 2023-11-17 | Stop reason: HOSPADM

## 2023-11-14 RX ORDER — METHYLERGONOVINE MALEATE 0.2 MG/ML
0.2 INJECTION INTRAVENOUS ONCE AS NEEDED
Status: DISCONTINUED | OUTPATIENT
Start: 2023-11-14 | End: 2023-11-17 | Stop reason: HOSPADM

## 2023-11-14 RX ORDER — LOPERAMIDE HYDROCHLORIDE 2 MG/1
4 CAPSULE ORAL EVERY 2 HOUR PRN
Status: DISCONTINUED | OUTPATIENT
Start: 2023-11-14 | End: 2023-11-17 | Stop reason: HOSPADM

## 2023-11-14 RX ORDER — ONDANSETRON HYDROCHLORIDE 2 MG/ML
4 INJECTION, SOLUTION INTRAVENOUS EVERY 6 HOURS PRN
Status: DISCONTINUED | OUTPATIENT
Start: 2023-11-14 | End: 2023-11-17 | Stop reason: HOSPADM

## 2023-11-14 RX ORDER — NIFEDIPINE 10 MG/1
10 CAPSULE ORAL ONCE AS NEEDED
Status: DISCONTINUED | OUTPATIENT
Start: 2023-11-14 | End: 2023-11-17 | Stop reason: HOSPADM

## 2023-11-14 RX ORDER — MISOPROSTOL 200 UG/1
800 TABLET ORAL ONCE AS NEEDED
Status: DISCONTINUED | OUTPATIENT
Start: 2023-11-14 | End: 2023-11-17 | Stop reason: HOSPADM

## 2023-11-14 RX ORDER — ADHESIVE BANDAGE
10 BANDAGE TOPICAL
Status: DISCONTINUED | OUTPATIENT
Start: 2023-11-14 | End: 2023-11-17 | Stop reason: HOSPADM

## 2023-11-14 RX ORDER — POLYETHYLENE GLYCOL 3350 17 G/17G
17 POWDER, FOR SOLUTION ORAL 2 TIMES DAILY PRN
Status: DISCONTINUED | OUTPATIENT
Start: 2023-11-14 | End: 2023-11-17 | Stop reason: HOSPADM

## 2023-11-14 RX ORDER — LIDOCAINE 560 MG/1
1 PATCH PERCUTANEOUS; TOPICAL; TRANSDERMAL
Status: DISCONTINUED | OUTPATIENT
Start: 2023-11-14 | End: 2023-11-17 | Stop reason: HOSPADM

## 2023-11-14 RX ORDER — DIPHENHYDRAMINE HYDROCHLORIDE 50 MG/ML
25 INJECTION INTRAMUSCULAR; INTRAVENOUS EVERY 6 HOURS PRN
Status: DISCONTINUED | OUTPATIENT
Start: 2023-11-14 | End: 2023-11-17 | Stop reason: HOSPADM

## 2023-11-14 RX ORDER — ACETAMINOPHEN 325 MG/1
975 TABLET ORAL EVERY 6 HOURS
Status: DISCONTINUED | OUTPATIENT
Start: 2023-11-14 | End: 2023-11-17 | Stop reason: HOSPADM

## 2023-11-14 RX ORDER — ONDANSETRON 4 MG/1
4 TABLET, FILM COATED ORAL EVERY 6 HOURS PRN
Status: DISCONTINUED | OUTPATIENT
Start: 2023-11-14 | End: 2023-11-17 | Stop reason: HOSPADM

## 2023-11-14 RX ORDER — BISACODYL 10 MG/1
10 SUPPOSITORY RECTAL DAILY PRN
Status: DISCONTINUED | OUTPATIENT
Start: 2023-11-14 | End: 2023-11-17 | Stop reason: HOSPADM

## 2023-11-14 RX ORDER — TRANEXAMIC ACID 100 MG/ML
1000 INJECTION, SOLUTION INTRAVENOUS ONCE AS NEEDED
Status: DISCONTINUED | OUTPATIENT
Start: 2023-11-14 | End: 2023-11-17 | Stop reason: HOSPADM

## 2023-11-14 RX ORDER — NEBULIZER AND COMPRESSOR
1 EACH MISCELLANEOUS ONCE
Qty: 1 EACH | Refills: 0 | Status: SHIPPED | OUTPATIENT
Start: 2023-11-14 | End: 2023-11-14 | Stop reason: SDUPTHER

## 2023-11-14 RX ORDER — IBUPROFEN 600 MG/1
600 TABLET ORAL EVERY 6 HOURS
Status: DISCONTINUED | OUTPATIENT
Start: 2023-11-14 | End: 2023-11-17 | Stop reason: HOSPADM

## 2023-11-14 RX ADMIN — IBUPROFEN 600 MG: 600 TABLET, FILM COATED ORAL at 05:50

## 2023-11-14 RX ADMIN — OXYTOCIN-SODIUM CHLORIDE 0.9% IV SOLN 30 UNIT/500ML 2 MILLI-UNITS/MIN: 30-0.9/5 SOLUTION at 01:34

## 2023-11-14 RX ADMIN — CARBOPROST TROMETHAMINE 250 MCG: 250 INJECTION, SOLUTION INTRAMUSCULAR at 03:58

## 2023-11-14 RX ADMIN — OXYTOCIN-SODIUM CHLORIDE 0.9% IV SOLN 30 UNIT/500ML 60 MILLI-UNITS/MIN: 30-0.9/5 SOLUTION at 03:27

## 2023-11-14 RX ADMIN — NIFEDIPINE 30 MG: 30 TABLET, FILM COATED, EXTENDED RELEASE ORAL at 21:03

## 2023-11-14 RX ADMIN — MISOPROSTOL 800 MCG: 200 TABLET ORAL at 03:58

## 2023-11-14 RX ADMIN — LOPERAMIDE HYDROCHLORIDE 4 MG: 2 CAPSULE ORAL at 04:19

## 2023-11-14 RX ADMIN — ACETAMINOPHEN 975 MG: 325 TABLET ORAL at 12:26

## 2023-11-14 RX ADMIN — ACETAMINOPHEN 975 MG: 325 TABLET ORAL at 05:50

## 2023-11-14 RX ADMIN — ACETAMINOPHEN 975 MG: 325 TABLET ORAL at 18:14

## 2023-11-14 RX ADMIN — OXYCODONE HYDROCHLORIDE 5 MG: 5 TABLET ORAL at 21:31

## 2023-11-14 RX ADMIN — IBUPROFEN 600 MG: 600 TABLET, FILM COATED ORAL at 18:14

## 2023-11-14 RX ADMIN — IBUPROFEN 600 MG: 600 TABLET, FILM COATED ORAL at 12:26

## 2023-11-14 RX ADMIN — PENICILLIN G 3 MILLION UNITS: 3000000 INJECTION, SOLUTION INTRAVENOUS at 01:45

## 2023-11-14 ASSESSMENT — PAIN SCALES - GENERAL
PAINLEVEL_OUTOF10: 7
PAINLEVEL_OUTOF10: 0 - NO PAIN
PAINLEVEL_OUTOF10: 0 - NO PAIN
PAINLEVEL_OUTOF10: 6
PAINLEVEL_OUTOF10: 0 - NO PAIN
PAINLEVEL_OUTOF10: 6
PAINLEVEL_OUTOF10: 0 - NO PAIN
PAINLEVEL_OUTOF10: 7
PAINLEVEL_OUTOF10: 8
PAINLEVEL_OUTOF10: 0 - NO PAIN
PAINLEVEL_OUTOF10: 6
PAINLEVEL_OUTOF10: 0 - NO PAIN
PAINLEVEL_OUTOF10: 0 - NO PAIN

## 2023-11-14 NOTE — L&D DELIVERY NOTE
OB Delivery Note  11/15/2023  Lavon Longo  23 y.o.   Vaginal, Spontaneous      Gestational Age: 39w4d  /Para:   Quantitative Blood Loss: 300 mL     Duglas Longo [[id number]]      Labor Events    Rupture date/time: 2023 2355  Rupture type: Artificial  Fluid color: Clear  Fluid odor: None  Labor type: Induced Onset of Labor  Labor allowed to proceed with plans for an attempted vaginal birth?: Yes  Induction: Oxytocin, Misoprostol, AROM  First cervical ripening date/time: 2023 1800  Complications: None       Labor Event Times    Labor onset date/time: 2023 1800       Labor Length    3rd stage: 0h 04m       Placenta    Placenta delivery date/time: 2023 032  Placenta removal: Spontaneous  Placenta appearance: Intact  Placenta disposition: discarded       Cord    Vessels: 3 vessels  Complications: None  Delayed cord clamping?: No  Cord clamped date/time: 2023  Cord blood disposition: Discarded  Gases sent?: No  Stem cell collection (by provider): No       Lacerations    Episiotomy: None  Perineal laceration: None  Periurethral laceration?: Yes  Periurethral laceration location: right  Periurethral laceration repaired?: No       Anesthesia    Method: Epidural       Operative Delivery    Forceps attempted?: No  Vacuum extractor attempted?: No       Shoulder Dystocia    Shoulder dystocia present?: No       [place] Delivery    Birth date/time: 2023 03:25:00  Delivery type: Vaginal, Spontaneous  Complications: None       Resuscitation    Method: Suctioning       Apgars    Living status: Living  Apgar Component Scores:  1 min.:  5 min.:  10 min.:  15 min.:  20 min.:    Skin color:  1  1       Heart rate:  2  2       Reflex irritability:  2  2       Muscle tone:  2  2       Respiratory effort:  1  2       Total:  8  9       Apgars assigned by: FRED BRANTLEY       Delivery Providers    Delivering clinician: Stormy Barrientos MD   Provider Role    José Manuel Winslow RN  Delivery Nurse    Twila Almanzar, RN Nursery Nurse     Resident    MD Stormy Olguin MD    Attending attestation:   I was present for the key portions of the procedure. I agree with the above documentation as written with any additions or modifications made in text.    Stormy Barrientos MD  OBGYN Attending

## 2023-11-14 NOTE — ANESTHESIA POSTPROCEDURE EVALUATION
Patient: Lavon Longo    Procedure Summary       Date: 11/13/23 Room / Location:     Anesthesia Start: 2145 Anesthesia Stop:     Procedure: Labor Analgesia Diagnosis:     Scheduled Providers:  Responsible Provider: Chad Whipple DO    Anesthesia Type: epidural ASA Status: 2            Anesthesia Type: epidural    Vitals Value Taken Time   /67 11/13/23 2247   Temp 36.7 11/13/23 2247   Pulse 78 11/13/23 2247   Resp 18 11/13/23 2247   SpO2 99 11/13/23 2247       Anesthesia Post Evaluation    Patient location during evaluation: bedside  Patient participation: complete - patient participated  Level of consciousness: awake  Pain score: 0  Pain management: adequate  Multimodal analgesia pain management approach  Airway patency: patent  Two or more strategies used to mitigate risk of obstructive sleep apnea  Cardiovascular status: acceptable  Respiratory status: acceptable  Hydration status: acceptable          There were no known notable events for this encounter.

## 2023-11-14 NOTE — ANESTHESIA PREPROCEDURE EVALUATION
"Patient: Lavon Longo    Evaluation Method: In-person visit    Procedure Information    Date: 11/13/23  Procedure: Labor Consult         [unfilled]    Clinical information reviewed:   Tobacco  Allergies  Meds   Med Hx  Surg Hx   Fam Hx  Soc Hx        NPO Detail:  NPO/Void Status  Date of Last Solid:  (pt currently eating)         OB/Gyn Evaluation    Present Pregnancy    Patient is pregnant now.   Obstetric History                Physical Exam    Airway  Mallampati: III  TM distance: >3 FB  Neck ROM: full     Cardiovascular   Rhythm: regular  Rate: normal     Dental - normal exam     Pulmonary   Breath sounds clear to auscultation     Abdominal   (+) obese         Epidural on 5/11/2022; 1 attempt, DMITRY @ 8.5cm.   Pt states there was a problem with her epidural catheter during previous L&D. States it did not work properly and she was \"feeling things I wasn't supposed to be feeling.\" Ipro record stated there was a concern that the catheter became disconnected. Pt states that her epidural catheter was replaced and new catheter worked well.     Noted no second epidural documented in previous labor analgesia record. Sk, aprn-crna    Anesthesia Plan    ASA 2     epidural     The patient is not a current smoker.  Patient did not smoke on day of procedure.    Anesthetic plan and risks discussed with patient.  Use of blood products discussed with patient who consented to blood products.    Plan discussed with CAA and attending.      Vitals:    11/13/23 2136   BP: (!) 146/72   Pulse: 92   Resp:    Temp:    SpO2:         "

## 2023-11-14 NOTE — CARE PLAN
The patient's goals for the shift include        Problem: Antepartum  Goal: Maintain pregnancy as long as maternal and/or fetal condition is stable  Outcome: Progressing  Goal: Avoid/minimize constipation  Outcome: Progressing  Goal: No decrease in circulation/VTE  Outcome: Progressing  Goal: FHR remains reassuring  Outcome: Progressing  Goal: Minimize anxiety/maximize coping  Outcome: Progressing     Problem: Vaginal Birth or  Section  Goal: Fetal and maternal status remain reassuring during the birth process  Outcome: Progressing  Goal: Tolerate CRB for IOL placement maintenance until dislodgement/removal 12hrs after placement  Outcome: Progressing  Goal: Prevention of malpresentation/labor dystocia through delivery  Outcome: Progressing  Goal: Demonstrates labor coping techniques through delivery  Outcome: Progressing  Goal: Minimal s/sx of HDP and BP<160/110  Outcome: Progressing  Goal: No s/sx of infection through recovery  Outcome: Progressing  Goal: No s/sx of hemorrhage through recovery  Outcome: Progressing     Problem: Postpartum  Goal: Appropriate maternal -  bonding  Outcome: Progressing     Problem: Hypertensive Disorder of Pregnancy (HDP)  Goal: Minimal s/sx of HDP and BP<160/110  Outcome: Progressing  Goal: Adequate urine output (0.5 ml/kg/hr)  Outcome: Progressing     Problem: Nausea/Vomiting  Goal: Adequate urine output (0.5 ml/kg/hr)  Outcome: Progressing  Goal: Free from nausea/vomiting  Outcome: Progressing  Goal: Tolerates prescribed diet  Outcome: Progressing  Goal: Weight maintenance or gain  Outcome: Progressing  Goal: Achieve/maintain normal electrolyte level  Outcome: Progressing     Problem: Safety - Adult  Goal: Free from fall injury  Outcome: Progressing     Problem: Pain - Adult  Goal: Verbalizes/displays adequate comfort level or baseline comfort level  Outcome: Progressing

## 2023-11-14 NOTE — SIGNIFICANT EVENT
Patient meets criteria for home monitoring of blood pressure post discharge.  Met with patient to assess for availability of home BP monitor.  Patient does not have access to BP monitor at home and declined obtaining BP monitor from [place] [facility]. Prescription provided for BP monitor and patient verbalized responsibility for obtaining her own BP monitor after discharge.  Patient educated on importance of continuing to monitor BP at home, recording BP on home monitoring log and s/sx of when to call her provider.  Pt verbalized understanding the above information.

## 2023-11-14 NOTE — INDIVIDUALIZED OVERALL PLAN OF CARE NOTE
Patient resting comfortably in bed.    FHTs: baseline 135, moderate variability, +accels, -decels  [place]: ctx q4min  SVE: 5/50/-2 AROMed for clear    A/P:     IOL/Labor    - Epidural infusing, good pain control  - CEFM; Cat 1 currently  - GBS+, PCN    Crys Mccormack MD PGY1

## 2023-11-14 NOTE — INDIVIDUALIZED OVERALL PLAN OF CARE NOTE
Patient resting comfortably in bed.    FHTs: baseline 140, moderate variability, +accels, -decels  [place]: ctx q2-3min  SVE: 6/50/-2    A/P:     IOL/Labor  - Titrate pitocin per protocol, currently at 2  - Epidural infusing  - CEFM; Cat 1 currently  - GBS+, PCN  Crys Mccormack MD PGY1

## 2023-11-14 NOTE — INDIVIDUALIZED OVERALL PLAN OF CARE NOTE
Patient resting comfortably in bed.    FHTs: baseline 140, moderate variability, +accels, -decels  [place]: ctx q2-3min  SVE: CRB in Cyto #2 palced    A/P:     IOL/Labor    - Epidural per pt request  - CEFM; Cat 1 currently  - GBS+, PCN  Crys Mccormack MD PGY1

## 2023-11-14 NOTE — SIGNIFICANT EVENT
"PT endorses feeling \"disoriented\" and slightly dizzy. Denies chest pain, pressure, tightness, palpitations. Denies SOB, Denies HA, N/V, RUQ pain, vision changes.     Visit Vitals  BP (!) 141/84   Pulse 65   Temp 36.6 °C (97.9 °F)   Resp 18     Pt seen ambulating in room in NAD. States she hasn't slept more than 4hrs since admission. Has eaten.    BP persistent low mild range w hx of sPEC requiring meds.   Start nifed 30 HS and repeat CBC now  "

## 2023-11-14 NOTE — ANESTHESIA PROCEDURE NOTES
Epidural Block    Patient location during procedure: OB  Start time: 11/13/2023 9:45 PM  End time: 11/13/2023 10:10 PM  Reason for block: labor analgesia  Staffing  Performed: CHRISTINE   Authorized by: CHRISTINE Dennison    Performed by: CHRISTINE Dennison    Preanesthetic Checklist  Completed: patient identified, IV checked, risks and benefits discussed, surgical consent, monitors and equipment checked, pre-op evaluation, timeout performed and sterile techniques followed  Block Timeout  RN/Licensed healthcare professional reads aloud to the Anesthesia provider and entire team: Patient identity, procedure with side and site, patient position, and as applicable the availability of implants/special equipment/special requirements.  Patient on coagulant treatment: no  Timeout performed at: 11/13/2023 9:45 PM  Block Placement  Patient position: sitting  Prep: ChloraPrep  Sterility prep: cap, drape, gloves and mask  Sedation level: no sedation  Patient monitoring: blood pressure, continuous pulse oximetry and heart rate  Approach: midline  Local numbing: lidocaine 1% to skin and subcutaneous tissues  Vertebral space: lumbar  Lumbar location: L2-L3  Epidural  Loss of resistance technique: saline  Guidance: landmark technique        Needle  Needle type: Tuohy   Needle gauge: 17  Needle insertion depth: 9 cm  Catheter type: multi-orifice  Catheter size: 19 G  Catheter at skin depth: 14 cm  Catheter securement method: clear occlusive dressing and liquid medical adhesive    Test dose: lidocaine 1.5% with epinephrine 1-to-200,000  Test dose given at 11/13/2023 10:07 PM  Test dose: lidocaine 1.5% with epinephrine 1-to-200,000  Test dose result: no positive test dose    PCEA  Medication concentration used: 0.044% Bupivacaine with 1.25 mcg/mL Fentanyl and [id number] Epinephrine  Dose (mL): 10  Lockout (minutes): 15  1-Hour Limit (boluses/hr): 4  Basal Rate: 14        Assessment  Events: no positive test dose  Additional Notes  2  attempts at L3/L4, 2 attempts at L2/L3.  [place] noted.

## 2023-11-14 NOTE — LACTATION NOTE
Lactation Consultant Note  Lactation Consultation  Reason for Consult: Initial assessment, Other (Comment) (RN request)  Consultant Name: Ana Garcia, FERCHO, IBCLC    Maternal Information  Has mother  before?: Yes  How long did the mother previously breastfeed?: for a year- started out pumping in the hospital d/t not latching/ supplementing with donor breast milk, then fela started to latch and fed well for a year  Previous Maternal Breastfeeding Challenges: Difficult latch  Infant to breast within first 2 hours of birth?: Yes  Exclusive Pump and Bottle Feed: No  WIC Program: Yes (mom is unsure if her WIC is active with this baby)    Maternal Assessment  Breast Assessment: Medium, Symmetrical, Soft, Compressible, Other (Comment) (very expressible on the left, minimal on the right)  Nipple Assessment: Intact, Erect  Areola Assessment: Normal    Infant Assessment  Infant Behavior: Sleepy  Infant Assessment: Palate - high/arch/bubble/normal, Other (Comment), Good cupping of tongue, Tongue protrudes over alveolar ridge    Feeding Assessment  Nutrition Source: Breastmilk  Feeding Method: Nursing at the breast  Feeding Position: Cross - cradle, C - hold, Both sides, Other (Comment), Skin to skin (needed assisstance with positioning- and latching technique)  Suck/Feeding: Unsustained, Tactile stimulation needed, Does not suckle  Latch Assessment: Maximum assistance is needed, Instructed on deep latch, Deep latch obtained, Wide open mouth < 160, Flanged lips, Other (Comment) (just held the breast once the baby was latched deeply)    LATCH TOOL  Latch: Repeated attempts, hold nipple in mouth, stimulate to suck  Audible Swallowing: None  Type of Nipple: Everted (After stimulation)  Comfort (Breast/Nipple): Soft/non-tender  Hold (Positioning): Minimal assist, teach one side, mother does other, staff holds  LATCH Score: 6    Breast Pump       Other OB Lactation Tools       Patient Follow-up  Inpatient  "Lactation Follow-up Needed : Yes  Outpatient Lactation Follow-up: Recommended (reviewed outpatient lactation resources and encouraged follow up as needed)    Other OB Lactation Documentation  Maternal Risk Factors: Other (comment) (difficulty wiht latching first child- pumped/ supplemented  (hx of high blood pressure and gestational diabetes) with first)  Infant Risk Factors: Early term birth 37-39 weeks    Recommendations/Summary  Called to room by Rn to assist with feeding.   Mom stated she has been latching the baby to the breast independently and denied any pain with the latch.   Mom stated her first child she had difficulty with latching the baby and she needed to pump and supplement the baby but, eventually the baby latched to the breast and breast fed for a year.   She denied any issue with milk production.     Offered to assist with latching the baby and mom was agreeable.   Observed how she was latching the baby and noted the baby needed better body alignment, better support, and mom was supporting her breast with a \"cigarette hold\" blocking the chin from reaching the breast tissue.   Reviewed positioning of baby (in cross cradle  hold),  use of pillow support, hand placement on baby and on breast, expression of colostrum to the nipple prior to latching (more expressible on the left than the right),  latching technique. Multiple attempts to get the baby to latch deeply and the baby would then just hold the breast after a few suckles. Sleepy; multiple attempts. Baby disinterested at this time- placed in skin to skin and encouraged mom to call for assistance, if needed if the baby starts to root.     Reviewed feeding cues, the normal feeding patterns in the first 24 hours of life, the role of colostrum, output on different days of life, milk production/ supply in the first few days of life, and the benefits of skin to skin.      Encouraged mom to breastfeed on demand with a goal of 8-12 feeds in a 24 hour " period. If baby is not showing feeding cues and it has been 3 hours since the last time the baby was fed or the last time the baby attempted to feed, encouraged her to place baby in skin to skin.      She stated she does not have her breast pump for at home anymore. Has the same insurance and received a breast pump with her last child (18 months ago). Mom is aware she may not qualify for a new pump, but, I will fax  her insurance to [employer].     Encouraged her to utilize the outpatient lactation resources, if needed.   Contact information given.   [phone] [facility] or [phone] [place]      Questions answered and report to RN.

## 2023-11-14 NOTE — DISCHARGE INSTRUCTIONS

## 2023-11-15 PROCEDURE — 96372 THER/PROPH/DIAG INJ SC/IM: CPT | Performed by: STUDENT IN AN ORGANIZED HEALTH CARE EDUCATION/TRAINING PROGRAM

## 2023-11-15 PROCEDURE — 2500000004 HC RX 250 GENERAL PHARMACY W/ HCPCS (ALT 636 FOR OP/ED)

## 2023-11-15 PROCEDURE — 2500000001 HC RX 250 WO HCPCS SELF ADMINISTERED DRUGS (ALT 637 FOR MEDICARE OP)

## 2023-11-15 PROCEDURE — 1210000001 HC SEMI-PRIVATE ROOM DAILY

## 2023-11-15 PROCEDURE — 2500000004 HC RX 250 GENERAL PHARMACY W/ HCPCS (ALT 636 FOR OP/ED): Performed by: STUDENT IN AN ORGANIZED HEALTH CARE EDUCATION/TRAINING PROGRAM

## 2023-11-15 RX ORDER — DIPHENHYDRAMINE HYDROCHLORIDE 50 MG/ML
25 INJECTION INTRAMUSCULAR; INTRAVENOUS ONCE
Status: DISCONTINUED | OUTPATIENT
Start: 2023-11-15 | End: 2023-11-17 | Stop reason: HOSPADM

## 2023-11-15 RX ORDER — METOCLOPRAMIDE HYDROCHLORIDE 5 MG/ML
10 INJECTION INTRAMUSCULAR; INTRAVENOUS ONCE
Status: COMPLETED | OUTPATIENT
Start: 2023-11-15 | End: 2023-11-15

## 2023-11-15 RX ORDER — ENOXAPARIN SODIUM 100 MG/ML
40 INJECTION SUBCUTANEOUS DAILY
Status: DISCONTINUED | OUTPATIENT
Start: 2023-11-15 | End: 2023-11-17 | Stop reason: HOSPADM

## 2023-11-15 RX ORDER — NIFEDIPINE 30 MG/1
30 TABLET, FILM COATED, EXTENDED RELEASE ORAL
Status: DISCONTINUED | OUTPATIENT
Start: 2023-11-15 | End: 2023-11-15

## 2023-11-15 RX ORDER — NIFEDIPINE 60 MG/1
60 TABLET, FILM COATED, EXTENDED RELEASE ORAL NIGHTLY
Status: DISCONTINUED | OUTPATIENT
Start: 2023-11-15 | End: 2023-11-17 | Stop reason: HOSPADM

## 2023-11-15 RX ADMIN — IBUPROFEN 600 MG: 600 TABLET, FILM COATED ORAL at 18:39

## 2023-11-15 RX ADMIN — ENOXAPARIN SODIUM 40 MG: 100 INJECTION SUBCUTANEOUS at 18:40

## 2023-11-15 RX ADMIN — IBUPROFEN 600 MG: 600 TABLET, FILM COATED ORAL at 00:12

## 2023-11-15 RX ADMIN — NIFEDIPINE 30 MG: 30 TABLET, FILM COATED, EXTENDED RELEASE ORAL at 02:43

## 2023-11-15 RX ADMIN — IBUPROFEN 600 MG: 600 TABLET, FILM COATED ORAL at 06:10

## 2023-11-15 RX ADMIN — IBUPROFEN 600 MG: 600 TABLET, FILM COATED ORAL at 12:22

## 2023-11-15 RX ADMIN — ACETAMINOPHEN 975 MG: 325 TABLET ORAL at 18:39

## 2023-11-15 RX ADMIN — ACETAMINOPHEN 975 MG: 325 TABLET ORAL at 12:22

## 2023-11-15 RX ADMIN — ACETAMINOPHEN 975 MG: 325 TABLET ORAL at 00:11

## 2023-11-15 RX ADMIN — DIPHENHYDRAMINE HYDROCHLORIDE 25 MG: 50 INJECTION INTRAMUSCULAR; INTRAVENOUS at 14:30

## 2023-11-15 RX ADMIN — NIFEDIPINE 60 MG: 60 TABLET, FILM COATED, EXTENDED RELEASE ORAL at 20:51

## 2023-11-15 RX ADMIN — ACETAMINOPHEN 975 MG: 325 TABLET ORAL at 06:10

## 2023-11-15 RX ADMIN — METOCLOPRAMIDE 10 MG: 5 INJECTION, SOLUTION INTRAMUSCULAR; INTRAVENOUS at 14:29

## 2023-11-15 ASSESSMENT — PAIN SCALES - GENERAL
PAINLEVEL_OUTOF10: 4
PAINLEVEL_OUTOF10: 9
PAINLEVEL_OUTOF10: 4
PAINLEVEL_OUTOF10: 9
PAINLEVEL_OUTOF10: 0 - NO PAIN

## 2023-11-15 NOTE — PROGRESS NOTES
Postpartum Progress Note    Assessment/Plan   Lavon Longo is a 23 y.o., , who was admitted for term IOL, delivered at 39w4d gestation and is now postpartum day 1 s/p .     Routine postpartum care  - meeting all milestones  - right periuerthral laceration,  mL  - bonding with female infant  - pain well controlled on po medications  - DVT Score: 5 - encourage ambulation,  SCDs, and  ppx lovenox  - B+, Rhogam not indicated  - admission hgb: 12.7 -> PPD#0 11.7  - PPBC: undecided    gHTN  - diagnosed by 2 mild range BPs > 4 hours apart  - c/o HA, given Tylenol, Ibuprofen, Benadryl and Reglan -> monitor for improvement (update 0 - pt reports resolution of HA with above meds and rest, continue to monitor)  - BP normotensive to mild range last 24 hours, most recently normotensive  - Nifedipine XL 30 mg nightly -> 60 mg (11/15)  - HELLP labs negative   - will continue to monitor, discussed 3 day stay for gHTN and pt states understanding  - BP cuff for home     Maternal Well-Being  - emotional support provided    [place] Feeding  - breastfeeding/pumping encouraged; lactation consult prn    Dispo:  anticipate d/c on PPD #3 if BP well-controlled and meeting all postpartum milestones, for f/u 1 week for BP check and 1 month with Primary OB provider    GINA [place]    Principal Problem:     (normal spontaneous vaginal delivery)  Active Problems:    Gestational hypertension, antepartum    H/O severe pre-eclampsia    Pregnancy Problems (from 10/20/23 to present)       Problem Noted Resolved     (normal spontaneous vaginal delivery) 2023 by Hodan Alonso MD No    Priority:  Medium      Overview Signed 2023  4:40 PM by Hodan Alonso MD     PNLs reviewed, wnl  GBS pending  ppBC: ppIUD  Admitted, consented, scanned - cephalic         38 weeks gestation of pregnancy 10/24/2023 by Corinne A Bazella, MD No    Priority:  Medium      H/O severe pre-eclampsia 10/20/2023 by Tanya GOODE  MD Nany No    Priority:  Medium      Overview Signed 2023  4:39 PM by Hodan Alonso MD     Normotensive this pregnancy  No PEC symptoms         History of gestational diabetes mellitus (GDM) 10/20/2023 by Tanya Ayon MD No    Priority:  Medium      High risk pregnancy, antepartum 10/20/2023 by Tanya Ayon MD No    Priority:  Medium      Overview Addendum 2023  1:43 PM by Emma Murray MD     Datin week US uncertain LMP  [] Initial BMI:   [x] Prenatal Labs:   [] Genetic Screening:    [x] Baby ASA:  [] Anatomy US:  [x] 1hr GCT at 24-28wks- completing today  [x] Tdap (27-36wks): 10/24  [x] Flu Shot: declined  [] COVID vaccine:   [] Rhogam (if Rh neg):   [x] GBS at 36 wks: GBS positive 10/25  [x] Breastfeeding- yes   [x] Postpartum Birth control method: LARC  [] 39 weeks discussion of IOL vs. Expectant management:  [x] Mode of delivery:  IOL scheduled- we discussed with h./o shoulder- we cannot predict if this will happen again- however she is at greater risk due to this history and prolonging the pregnancy allows the fetus to increase in size. Discussed risks of PEC and return precautions.          Gestational hypertension, antepartum 10/19/2023 by Giovana Garcia No    Priority:  Medium      Obesity 8/10/2023 by Giovana Garcia No    Priority:  Medium      H/O shoulder dystocia in prior pregnancy, currently pregnant 10/20/2023 by Tanya yAon MD 2023 by TOM Baca-CNP    Overview Addendum 2023  4:39 PM by Hodan Alonso MD     GDM in G1, patient unaware at the time, child with no deficits - birthweight 6#8.   The patient was counseled extensively on the risks of a shoulder dystocia, including the maternal risks of perineal lacerations, pubic symphysis dislocations, episiotomy and the fetal risks of brachial plexus injuries, clavicular/humeral fractures, HIE, and death. We discussed the different maneuvers that we would use and the indications for a CD including  NRFHTS, arrest of dilation or arrest of descent. The patient understands the risks and  desires to proceed with the IOL.                 Subjective   Her pain is well controlled with current medications  She is passing flatus  She is ambulating well  She is tolerating a Adult diet Regular  She reports no breast or nursing problems  She denies emotional concerns today      C/o 5/10 temporal HA since 11 AM. Received Tylenol/Ibuprofen for the first time since HA started just prior to my assessment. No history of migraines outside of pregnancy. Denies scotoma, RUQ pain, CP or SOB.    Objective   Allergies:   Patient has no allergy information on record.         Last Vitals:  Temp Pulse Resp BP MAP Pulse Ox   36.3 °C (97.3 °F) 87 18 130/83   96 %     Vitals Min/Max Last 24 Hours:  Temp  Min: 36.2 °C (97.2 °F)  Max: 36.6 °C (97.9 °F)  Pulse  Min: 61  Max: 87  Resp  Min: 16  Max: 18  BP  Min: 129/73  Max: 169/73    Intake/Output:   No intake or output data in the 24 hours ending 11/15/23 1502    Physical Exam:  General: well appearing, well-nourished, postpartum  Obstetric: abdomen soft/non-tender, fundus firm below umbilicus, lochia light  Skin: No rashes/lesions/erythema  Neuro: A/Ox3, conversational  GI: +flatus  Respiratory: Even and unlabored on RA  Extremities: No edema, discoloration, or pain in BLE, equal and palpable DP and PT pulses    Psych: appropriate mood and affect     Lab Data:  Lab Results   Component Value Date    WBC 15.1 (H) 11/14/2023    HGB 11.7 (L) 11/14/2023    HCT 35.2 (L) 11/14/2023     11/14/2023     Lab Results   Component Value Date    GLUCOSE 82 11/13/2023     11/13/2023    K 3.9 11/13/2023     11/13/2023    CO2 26 11/13/2023    ANIONGAP 13 11/13/2023    BUN 9 11/13/2023    CREATININE 0.58 11/13/2023    EGFR >90 11/13/2023    CALCIUM 8.6 11/13/2023    ALBUMIN 3.6 11/13/2023    PROT 6.3 (L) 11/13/2023    ALKPHOS 139 (H) 11/13/2023    ALT 11 11/13/2023    AST 17 11/13/2023     BILITOT 0.2 11/13/2023

## 2023-11-15 NOTE — LACTATION NOTE
Lactation Consultant Note  Lactation Consultation  Reason for Consult: Follow-up assessment  Consultant Name: Ana Garcia RN, IBCLC    Maternal Information       Maternal Assessment  Breast Assessment: Medium, Symmetrical, Soft, Compressible  Nipple Assessment: Intact, Erect  Areola Assessment: Normal    Infant Assessment  Infant Behavior: Sleepy, Readiness to feed, Suckles on and off, needs stimulation    Feeding Assessment  Nutrition Source: Breastmilk  Feeding Method: Nursing at the breast  Feeding Position: Cross - cradle, Cradle, C - hold, Skin to skin, One side per feeding, Nipple to nose, Mother needs assistance with latch/positioning, Other (Comment) (encouraged her to unwrap the baby for feeds and to support the breast until the baby gets into a good rhythmic pattern to avoid the baby slipping shallow or coming off of the breast)  Suck/Feeding: Sustained, Coordinated suck/swallow/breathe, Tactile stimulation needed, Swallowing intermittently only with encouragement  Latch Assessment: Minimal assistance is needed, Instructed on deep latch, Deep latch obtained, Optimal angle of mouth opening, Comfortable with no pain, Flanged lips    LATCH TOOL  Latch: Repeated attempts, hold nipple in mouth, stimulate to suck  Audible Swallowing: A few with stimulation  Type of Nipple: Everted (After stimulation)  Comfort (Breast/Nipple): Soft/non-tender  Hold (Positioning): Minimal assist, teach one side, mother does other, staff holds  LATCH Score: 7    Breast Pump       Other OB Lactation Tools       Patient Follow-up  Inpatient Lactation Follow-up Needed : Yes  Outpatient Lactation Follow-up: Recommended (follow up as needed)    Other OB Lactation Documentation       Recommendations/Summary  Mom attempting to latch the baby when I entered the room. Baby sleepy but, is swaddled.   Encouraged mom to unswaddle the baby for feeds and reviewed waking techniques. Baby awakened and was showing feeding cues.    Minimal assistance proivided to mom with latching the baby- encouraged her to keep supporting the breast until the baby is in a good rhythmic pattern to avoid the baby slipping shallow or coming off of the breast.   Deep latch obtained after a couple of attempts and mom stated the latch as comfortable. Noted swallows.     Reviewed feeding cues, breat feeding on demand, output on different days of life, average percentage of weight loss, milk production/ supply, and the other breastfeeding education topics.      Encouraged mom to breastfeed on demand with a goal of 8-12 feeds in a 24 hour period. If baby is not showing feeding cues and it has been 3 hours since the last time the baby was fed or the last time the baby attempted to feed, encouraged her to place baby in skin to skin.      Mom denies any further questions.   Encouraged her to call for assistance, if needed.   She has the outpatient lactation resource list and encouraged her to utilize outpatient lactation resources, if needed.   Mom has a breast pump

## 2023-11-15 NOTE — CARE PLAN
The patient's goals for the shift include rest and pain control    The clinical goals for the shift include BPs remain within normal limits through the shift.    Over the shift, the patient did not make progress toward the following goals. Barriers to progression include n/a. Recommendations to address these barriers include n/a.

## 2023-11-16 PROCEDURE — 2500000001 HC RX 250 WO HCPCS SELF ADMINISTERED DRUGS (ALT 637 FOR MEDICARE OP)

## 2023-11-16 PROCEDURE — 1210000001 HC SEMI-PRIVATE ROOM DAILY

## 2023-11-16 PROCEDURE — 2500000004 HC RX 250 GENERAL PHARMACY W/ HCPCS (ALT 636 FOR OP/ED): Performed by: STUDENT IN AN ORGANIZED HEALTH CARE EDUCATION/TRAINING PROGRAM

## 2023-11-16 PROCEDURE — 2500000004 HC RX 250 GENERAL PHARMACY W/ HCPCS (ALT 636 FOR OP/ED)

## 2023-11-16 PROCEDURE — 96372 THER/PROPH/DIAG INJ SC/IM: CPT | Performed by: STUDENT IN AN ORGANIZED HEALTH CARE EDUCATION/TRAINING PROGRAM

## 2023-11-16 RX ADMIN — IBUPROFEN 600 MG: 600 TABLET, FILM COATED ORAL at 18:20

## 2023-11-16 RX ADMIN — ACETAMINOPHEN 975 MG: 325 TABLET ORAL at 12:09

## 2023-11-16 RX ADMIN — ENOXAPARIN SODIUM 40 MG: 100 INJECTION SUBCUTANEOUS at 09:45

## 2023-11-16 RX ADMIN — IBUPROFEN 600 MG: 600 TABLET, FILM COATED ORAL at 12:09

## 2023-11-16 RX ADMIN — ACETAMINOPHEN 975 MG: 325 TABLET ORAL at 18:19

## 2023-11-16 RX ADMIN — IBUPROFEN 600 MG: 600 TABLET, FILM COATED ORAL at 00:13

## 2023-11-16 RX ADMIN — ACETAMINOPHEN 975 MG: 325 TABLET ORAL at 06:15

## 2023-11-16 RX ADMIN — ACETAMINOPHEN 975 MG: 325 TABLET ORAL at 00:13

## 2023-11-16 RX ADMIN — NIFEDIPINE 60 MG: 60 TABLET, FILM COATED, EXTENDED RELEASE ORAL at 20:49

## 2023-11-16 RX ADMIN — IBUPROFEN 600 MG: 600 TABLET, FILM COATED ORAL at 06:15

## 2023-11-16 ASSESSMENT — PAIN DESCRIPTION - DESCRIPTORS
DESCRIPTORS: CRAMPING

## 2023-11-16 ASSESSMENT — PAIN SCALES - GENERAL
PAINLEVEL_OUTOF10: 3
PAINLEVEL_OUTOF10: 5 - MODERATE PAIN
PAINLEVEL_OUTOF10: 5 - MODERATE PAIN
PAINLEVEL_OUTOF10: 0 - NO PAIN

## 2023-11-16 NOTE — PROGRESS NOTES
Postpartum Progress Note    Assessment/Plan   Lavon Longo is a 23 y.o., , who delivered at 39w4d gestation and is now postpartum day 2.    gHTN  - diagnosed by 2 mild range BPs > 4 hours apart  - c/o HA, given Tylenol, Ibuprofen, Benadryl and Reglan -> resolved, denies any HA today  - BP normotensive to mild range last 24 hours, most recently normotensive  - Nifedipine XL 30 mg nightly -> 60 mg (11/15)  - HELLP labs negative   - will continue to monitor, discussed 3 day stay for gHTN and pt states understanding  - BP cuff for home     #Postpartum  - continue routine postpartum care  - pain well controlled on po medications  - dvt risk score  5  , for ppx lovenox      #Maternal Well-Being  - emotional support provided  - bonding with infant    #[place] Feeding  - breastfeeding/pumping encouraged; lactation consult prn    #Dispo  - Anticipate DC PPD#3 pending BP control.  - The signs and symptoms of PEC were reviewed with the patient, including unrelenting headache, vision changes/blurred vision, and RUQ pain  - BP cuff for home for checking BP twice a day  - Pt instructed to call primary OB if SBP > 160 or DBP > 110 or if development of PEC symptoms   - On discharge, follow up with primary OB in:  2-5 days    Principal Problem:     (normal spontaneous vaginal delivery)  Active Problems:    Gestational hypertension, antepartum    H/O severe pre-eclampsia    Pregnancy Problems (from 10/20/23 to present)       Problem Noted Resolved     (normal spontaneous vaginal delivery) 2023 by Hodan Alonso MD No    Priority:  Medium      Overview Signed 2023  4:40 PM by Hodan Alonso MD     PNLs reviewed, wnl  GBS pending  ppBC: ppIUD  Admitted, consented, scanned - cephalic         38 weeks gestation of pregnancy 10/24/2023 by Corinne A Bazella, MD No    Priority:  Medium      H/O severe pre-eclampsia 10/20/2023 by Tanya Ayon MD No    Priority:  Medium      Overview Signed 2023  4:39 PM  by Hodan Alonso MD     Normotensive this pregnancy  No PEC symptoms         History of gestational diabetes mellitus (GDM) 10/20/2023 by Tanya Ayon MD No    Priority:  Medium      High risk pregnancy, antepartum 10/20/2023 by Tanya Ayon MD No    Priority:  Medium      Overview Addendum 2023  1:43 PM by Emma Murray MD     Datin week US uncertain LMP  [] Initial BMI:   [x] Prenatal Labs:   [] Genetic Screening:    [x] Baby ASA:  [] Anatomy US:  [x] 1hr GCT at 24-28wks- completing today  [x] Tdap (27-36wks): 10/24  [x] Flu Shot: declined  [] COVID vaccine:   [] Rhogam (if Rh neg):   [x] GBS at 36 wks: GBS positive 10/25  [x] Breastfeeding- yes   [x] Postpartum Birth control method: LARC  [] 39 weeks discussion of IOL vs. Expectant management:  [x] Mode of delivery:  IOL scheduled- we discussed with h./o shoulder- we cannot predict if this will happen again- however she is at greater risk due to this history and prolonging the pregnancy allows the fetus to increase in size. Discussed risks of PEC and return precautions.          Gestational hypertension, antepartum 10/19/2023 by Giovana Garcia No    Priority:  Medium      Obesity 8/10/2023 by Giovana Garcia No    Priority:  Medium      H/O shoulder dystocia in prior pregnancy, currently pregnant 10/20/2023 by Tanya Ayon MD 2023 by TOM Baca-CNP    Overview Addendum 2023  4:39 PM by Hodan Alonso MD     GDM in G1, patient unaware at the time, child with no deficits - birthweight 6#8.   The patient was counseled extensively on the risks of a shoulder dystocia, including the maternal risks of perineal lacerations, pubic symphysis dislocations, episiotomy and the fetal risks of brachial plexus injuries, clavicular/humeral fractures, HIE, and death. We discussed the different maneuvers that we would use and the indications for a CD including NRFHTS, arrest of dilation or arrest of descent. The patient understands the  risks and  desires to proceed with the IOL.                 Hospital course: no complications  Vaginal Birth  Patient is currently breastfeedingThe patient's blood type is B POS. The baby's blood type is pending. Rhogam is not indicated.    Subjective   Her pain is well controlled with current medications  She is passing flatus  She is ambulating well  She is tolerating a Adult diet Regular  She reports no breast or nursing problems  She denies emotional concerns today   Her plan for contraception is none     Understands continued monitoring.  Likely need for up titrating bp meds today.    Objective   Allergies:   Patient has no allergy information on record.         Last Vitals:  Temp Pulse Resp BP MAP Pulse Ox   36.1 °C (97 °F) 81 20 135/73   97 %     Vitals Min/Max Last 24 Hours:  Temp  Min: 35.6 °C (96.1 °F)  Max: 37.4 °C (99.3 °F)  Pulse  Min: 79  Max: 100  Resp  Min: 18  Max: 20  BP  Min: 118/70  Max: 168/92    Intake/Output:   No intake or output data in the 24 hours ending 11/16/23 1046    Physical Exam:  General: Examination reveals a well developed, well nourished, female, in no acute distress. She is alert and cooperative.  Lungs: symmetrical, non-labored breathing.  Cardiac: warm, well-perfused.  Abdomen: soft, non-tender.  Fundus: firm, at umbilicus, and nontender.  Extremities: no redness or tenderness in the calves or thighs.  Neurological: alert, oriented, normal speech, no focal findings or movement disorder noted.      Lab Data:  Lab Results   Component Value Date    WBC 15.1 (H) 11/14/2023    HGB 11.7 (L) 11/14/2023    HCT 35.2 (L) 11/14/2023     11/14/2023

## 2023-11-16 NOTE — CARE PLAN
Problem: Postpartum  Goal: Experiences normal postpartum course  Outcome: Progressing  Goal: No s/sx of hemorrhage  Outcome: Progressing     Problem: Pain - Adult  Goal: Verbalizes/displays adequate comfort level or baseline comfort level  Outcome: Progressing     Problem: Safety - Adult  Goal: Free from fall injury  Outcome: Progressing     Patient had some mild-severe BP's. Pt on nifedipine 60mg. Other VSS. Pain controlled with tylenol and motrin.

## 2023-11-17 ENCOUNTER — PHARMACY VISIT (OUTPATIENT)
Dept: PHARMACY | Facility: CLINIC | Age: 23
End: 2023-11-17
Payer: COMMERCIAL

## 2023-11-17 VITALS
SYSTOLIC BLOOD PRESSURE: 131 MMHG | DIASTOLIC BLOOD PRESSURE: 82 MMHG | TEMPERATURE: 96.8 F | OXYGEN SATURATION: 94 % | WEIGHT: 264 LBS | BODY MASS INDEX: 40.14 KG/M2 | HEART RATE: 74 BPM | RESPIRATION RATE: 16 BRPM

## 2023-11-17 PROCEDURE — 96372 THER/PROPH/DIAG INJ SC/IM: CPT | Performed by: STUDENT IN AN ORGANIZED HEALTH CARE EDUCATION/TRAINING PROGRAM

## 2023-11-17 PROCEDURE — 2500000001 HC RX 250 WO HCPCS SELF ADMINISTERED DRUGS (ALT 637 FOR MEDICARE OP)

## 2023-11-17 PROCEDURE — 99231 SBSQ HOSP IP/OBS SF/LOW 25: CPT | Performed by: NURSE PRACTITIONER

## 2023-11-17 PROCEDURE — 2500000004 HC RX 250 GENERAL PHARMACY W/ HCPCS (ALT 636 FOR OP/ED): Performed by: STUDENT IN AN ORGANIZED HEALTH CARE EDUCATION/TRAINING PROGRAM

## 2023-11-17 PROCEDURE — RXMED WILLOW AMBULATORY MEDICATION CHARGE

## 2023-11-17 RX ORDER — DOCUSATE SODIUM 100 MG/1
100 CAPSULE, LIQUID FILLED ORAL 2 TIMES DAILY PRN
Qty: 60 CAPSULE | Refills: 0 | Status: SHIPPED | OUTPATIENT
Start: 2023-11-17 | End: 2023-12-17

## 2023-11-17 RX ORDER — ACETAMINOPHEN 500 MG
1000 TABLET ORAL EVERY 6 HOURS PRN
Qty: 120 TABLET | Refills: 0 | Status: SHIPPED | OUTPATIENT
Start: 2023-11-17

## 2023-11-17 RX ORDER — NIFEDIPINE 30 MG/1
30 TABLET, FILM COATED, EXTENDED RELEASE ORAL
Qty: 30 TABLET | Refills: 1 | Status: SHIPPED | OUTPATIENT
Start: 2023-11-17 | End: 2023-11-17 | Stop reason: SDUPTHER

## 2023-11-17 RX ORDER — NIFEDIPINE 30 MG/1
60 TABLET, FILM COATED, EXTENDED RELEASE ORAL
Qty: 60 TABLET | Refills: 1 | Status: SHIPPED | OUTPATIENT
Start: 2023-11-17 | End: 2024-01-16

## 2023-11-17 RX ORDER — IBUPROFEN 600 MG/1
600 TABLET ORAL EVERY 6 HOURS PRN
Qty: 120 TABLET | Refills: 0 | Status: SHIPPED | OUTPATIENT
Start: 2023-11-17 | End: 2023-12-17

## 2023-11-17 RX ADMIN — ACETAMINOPHEN 975 MG: 325 TABLET ORAL at 05:57

## 2023-11-17 RX ADMIN — ENOXAPARIN SODIUM 40 MG: 100 INJECTION SUBCUTANEOUS at 08:25

## 2023-11-17 RX ADMIN — IBUPROFEN 600 MG: 600 TABLET, FILM COATED ORAL at 12:31

## 2023-11-17 RX ADMIN — IBUPROFEN 600 MG: 600 TABLET, FILM COATED ORAL at 05:58

## 2023-11-17 RX ADMIN — ACETAMINOPHEN 975 MG: 325 TABLET ORAL at 12:31

## 2023-11-17 RX ADMIN — IBUPROFEN 600 MG: 600 TABLET, FILM COATED ORAL at 00:01

## 2023-11-17 RX ADMIN — ACETAMINOPHEN 975 MG: 325 TABLET ORAL at 00:00

## 2023-11-17 ASSESSMENT — PAIN SCALES - GENERAL
PAINLEVEL_OUTOF10: 4
PAINLEVEL_OUTOF10: 2
PAINLEVEL_OUTOF10: 4

## 2023-11-17 ASSESSMENT — PAIN DESCRIPTION - DESCRIPTORS: DESCRIPTORS: CRAMPING

## 2023-11-17 NOTE — DISCHARGE SUMMARY
Discharge Summary    Admission Date: 2023  Discharge Date: 23  Discharge Diagnosis:  (normal spontaneous vaginal delivery)     Patient Active Problem List   Diagnosis    Gestational hypertension, antepartum    Obesity    H/O severe pre-eclampsia    History of gestational diabetes mellitus (GDM)    High risk pregnancy, antepartum    38 weeks gestation of pregnancy    Intercostal pain     (normal spontaneous vaginal delivery)    Gastroesophageal reflux disease without esophagitis       Hospital Course  Lavon Longo is a 23 y.o.,     Initially presented for: rrIOL    Admission Date: 2023    Delivery Date: 2023  3:25 AM     Delivery type: Vaginal, Spontaneous      GA at delivery: 39w4d    Outcome: Living     Anesthesia during delivery: Epidural     Intrapartum complications: None     Feeding method: Breastfeeding Status: Yes    Contraception: Defers contraception to primary OB/PP visit. We discussed pregnancy spacing of at least one year, abstaining from intercourse for 6wks, and the ability to become pregnant in the absence of regular menses. Pt verbalized understanding.     Rhogam: The patient's blood type is B POS.  Rhogam is not indicated.     Now postpartum day: 3.    Hospital course n/f:  New gHTN  - diagnosed by 2 mild range BPs > 4 hours apart  - asymptomatic   - Nifedipine XL 30 mg nightly -> 60 mg (11/15)  - BP's now normotensive  - HELLP labs negative   - given BP cuff for home monitoring BID  - Scheduled BP check @ [place] in 2-5 days    PP course otherwise uneventful.  Meeting all postpartum milestones- ambulating independently, passing flatus, tolerating PO intake, lochia light, voiding spontaneously, and pain well controlled with PO meds.     Dispo  OK for DC today    Pertinent Physical Exam At Time of Discharge  General: well appearing, well nourished, postpartum  Obstetric: fundus firm below umbilicus, lochia light  Skin: Warm, dry; no  rashes/lesions/erythema  Breast: No masses, nipple discharge  Neuro: A/Ox3, conversational, no gross motor deficit   GI: no distension, appropriately tender, soft, +BS  Respiratory: Even and unlabored on RA, LSCTA BL  Cardiovascular: No BLE edema; No erythema, warmth  Psych: appropriate mood and affect       Your medication list        START taking these medications        Instructions Last Dose Given Next Dose Due   acetaminophen 500 mg tablet  Commonly known as: Tylenol      Take 2 tablets (1,000 mg) by mouth every 6 hours if needed for moderate pain (4 - 6).       docusate sodium 100 mg capsule  Commonly known as: Colace      Take 1 capsule (100 mg) by mouth 2 times a day as needed for constipation.        mg tablet  Generic drug: ibuprofen      Take 1 tablet (600 mg) by mouth every 6 hours if needed for moderate pain (4 - 6) (pain).       NIFEdipine ER 30 mg 24 hr tablet  Commonly known as: Adalat CC      Take 2 tablets (60 mg) by mouth once daily in the morning. Take before meals. Do not crush, chew, or split.              CONTINUE taking these medications        Instructions Last Dose Given Next Dose Due   lancets misc           Prenatal 27 mg iron-800 mcg folic acid tablet  Generic drug: prenatal vitamin (iron-folic)      TAKE 1 TABLET BY MOUTH ONCE DAILY              STOP taking these medications      aspirin 81 mg EC tablet        PRECARE PO               ASK your doctor about these medications        Instructions Last Dose Given Next Dose Due   Blood Pressure Cuff misc  Generic drug: miscellaneous medical supply  Ask about: Should I take this medication?      1 each 1 time for 1 dose.                 Where to Get Your Medications        These medications were sent to [facility] [place] [address]      Hours: 8:30 AM to 5 PM Mon-Fri Phone: [phone]   acetaminophen 500 mg tablet  docusate sodium 100 mg capsule   mg tablet  NIFEdipine ER 30 mg 24 hr  tablet       You can get these medications from any pharmacy    Bring a paper prescription for each of these medications  Blood Pressure Cuff misc           Outpatient Follow-Up  No future appointments.    Purnima Lema, APRN-CNP

## 2023-11-17 NOTE — CARE PLAN
The patient's goals for the shift include rest, bonding with baby    The clinical goals for the shift include adequate pain control    Ptt vitals and assessments stable, pain well controlled during this shift

## 2023-11-18 NOTE — SIGNIFICANT EVENT
Follow-up Phone Call Note:   Interview:  Care Type: Women's Health   Phone Number Call  [phone]   Call Outcome: No Answer-not available message         Date/Time Of Call: 11/18/23 at 1156   Call Back Done By: care coordinator   Callback Complete:  Yes                                                                                             Date/Time Of Call: 11/18/23 at 1156   Call Back Done By: care coordinator   Callback Complete:  Yes

## 2023-12-13 LAB — HOLD SPECIMEN: NORMAL

## 2024-04-22 ENCOUNTER — APPOINTMENT (OUTPATIENT)
Dept: RADIOLOGY | Facility: HOSPITAL | Age: 24
End: 2024-04-22
Payer: COMMERCIAL

## 2024-04-22 ENCOUNTER — HOSPITAL ENCOUNTER (EMERGENCY)
Facility: HOSPITAL | Age: 24
Discharge: HOME | End: 2024-04-22
Attending: EMERGENCY MEDICINE
Payer: COMMERCIAL

## 2024-04-22 VITALS
HEART RATE: 86 BPM | SYSTOLIC BLOOD PRESSURE: 133 MMHG | BODY MASS INDEX: 37.13 KG/M2 | WEIGHT: 245 LBS | RESPIRATION RATE: 18 BRPM | OXYGEN SATURATION: 99 % | HEIGHT: 68 IN | DIASTOLIC BLOOD PRESSURE: 68 MMHG | TEMPERATURE: 97.7 F

## 2024-04-22 DIAGNOSIS — S93.402A SPRAIN OF LEFT ANKLE, INITIAL ENCOUNTER: Primary | ICD-10-CM

## 2024-04-22 PROCEDURE — 73610 X-RAY EXAM OF ANKLE: CPT | Mod: LT

## 2024-04-22 PROCEDURE — 2500000004 HC RX 250 GENERAL PHARMACY W/ HCPCS (ALT 636 FOR OP/ED): Mod: SE | Performed by: STUDENT IN AN ORGANIZED HEALTH CARE EDUCATION/TRAINING PROGRAM

## 2024-04-22 PROCEDURE — 96374 THER/PROPH/DIAG INJ IV PUSH: CPT

## 2024-04-22 PROCEDURE — 99284 EMERGENCY DEPT VISIT MOD MDM: CPT | Mod: 25

## 2024-04-22 PROCEDURE — 99284 EMERGENCY DEPT VISIT MOD MDM: CPT | Performed by: EMERGENCY MEDICINE

## 2024-04-22 PROCEDURE — 73610 X-RAY EXAM OF ANKLE: CPT | Mod: LEFT SIDE | Performed by: RADIOLOGY

## 2024-04-22 RX ORDER — ACETAMINOPHEN 325 MG/1
650 TABLET ORAL EVERY 6 HOURS PRN
Qty: 30 TABLET | Refills: 0 | Status: SHIPPED | OUTPATIENT
Start: 2024-04-22

## 2024-04-22 RX ORDER — IBUPROFEN 600 MG/1
600 TABLET ORAL EVERY 8 HOURS PRN
Qty: 30 TABLET | Refills: 0 | Status: SHIPPED | OUTPATIENT
Start: 2024-04-22

## 2024-04-22 RX ORDER — ACETAMINOPHEN 325 MG/1
650 TABLET ORAL EVERY 6 HOURS PRN
Qty: 30 TABLET | Refills: 0 | Status: SHIPPED | OUTPATIENT
Start: 2024-04-22 | End: 2024-04-22

## 2024-04-22 RX ORDER — IBUPROFEN 600 MG/1
600 TABLET ORAL EVERY 8 HOURS PRN
Qty: 30 TABLET | Refills: 0 | Status: SHIPPED | OUTPATIENT
Start: 2024-04-22 | End: 2024-04-22

## 2024-04-22 RX ORDER — HYDROMORPHONE HYDROCHLORIDE 1 MG/ML
0.5 INJECTION, SOLUTION INTRAMUSCULAR; INTRAVENOUS; SUBCUTANEOUS ONCE
Status: COMPLETED | OUTPATIENT
Start: 2024-04-22 | End: 2024-04-22

## 2024-04-22 RX ADMIN — HYDROMORPHONE HYDROCHLORIDE 0.5 MG: 1 INJECTION, SOLUTION INTRAMUSCULAR; INTRAVENOUS; SUBCUTANEOUS at 02:07

## 2024-04-22 ASSESSMENT — PAIN DESCRIPTION - ORIENTATION: ORIENTATION: LEFT

## 2024-04-22 ASSESSMENT — COLUMBIA-SUICIDE SEVERITY RATING SCALE - C-SSRS
6. HAVE YOU EVER DONE ANYTHING, STARTED TO DO ANYTHING, OR PREPARED TO DO ANYTHING TO END YOUR LIFE?: NO
1. IN THE PAST MONTH, HAVE YOU WISHED YOU WERE DEAD OR WISHED YOU COULD GO TO SLEEP AND NOT WAKE UP?: NO
2. HAVE YOU ACTUALLY HAD ANY THOUGHTS OF KILLING YOURSELF?: NO

## 2024-04-22 ASSESSMENT — PAIN - FUNCTIONAL ASSESSMENT: PAIN_FUNCTIONAL_ASSESSMENT: 0-10

## 2024-04-22 ASSESSMENT — PAIN SCALES - GENERAL: PAINLEVEL_OUTOF10: 9

## 2024-04-22 ASSESSMENT — LIFESTYLE VARIABLES
TOTAL SCORE: 0
HAVE PEOPLE ANNOYED YOU BY CRITICIZING YOUR DRINKING: NO
EVER FELT BAD OR GUILTY ABOUT YOUR DRINKING: NO
EVER HAD A DRINK FIRST THING IN THE MORNING TO STEADY YOUR NERVES TO GET RID OF A HANGOVER: NO
HAVE YOU EVER FELT YOU SHOULD CUT DOWN ON YOUR DRINKING: NO

## 2024-04-22 ASSESSMENT — PAIN DESCRIPTION - PAIN TYPE: TYPE: ACUTE PAIN

## 2024-04-22 ASSESSMENT — PAIN DESCRIPTION - LOCATION: LOCATION: ANKLE

## 2024-04-22 NOTE — ED PROVIDER NOTES
HPI   Chief Complaint   Patient presents with    Ankle Pain       HPI  23-year-old female without significant past medical history presents with left ankle pain after a fall.  Patient reports suffering a mechanical ground-level fall where she reportedly lost her footing and fell down 2 steps without head strike or loss consciousness.  Patient does not take blood thinners.  Patient states her left ankle was caught underneath her when she fell and she is subsequently had significant pain.  She denies weakness, numbness, tingling.                  Sheldahl Coma Scale Score: 15                     Patient History   Past Medical History:   Diagnosis Date     (normal spontaneous vaginal delivery) ([facility]) 2023    PNLs reviewed, wnl  GBS pending  ppBC: ppIUD  Admitted, consented, scanned - cephalic     No past surgical history on file.  Family History   Problem Relation Name Age of Onset    No Known Problems Mother      No Known Problems Father       Social History     Tobacco Use    Smoking status: Never    Smokeless tobacco: Never   Substance Use Topics    Alcohol use: Not Currently    Drug use: Never       Physical Exam   ED Triage Vitals [24 0037]   Temperature Heart Rate Respirations BP   36.5 °C (97.7 °F) 86 18 133/68      Pulse Ox Temp src Heart Rate Source Patient Position   99 % -- -- --      BP Location FiO2 (%)     -- --       Physical Exam  Vitals and nursing note reviewed.   Constitutional:       Appearance: Normal appearance.   HENT:      Head: Normocephalic.      Mouth/Throat:      Mouth: Mucous membranes are moist.   Eyes:      Conjunctiva/sclera: Conjunctivae normal.   Cardiovascular:      Rate and Rhythm: Normal rate.   Pulmonary:      Effort: Pulmonary effort is normal.   Abdominal:      General: Abdomen is flat.   Musculoskeletal:         General: Tenderness present. No deformity.      Comments: Tenderness to palpation of the medial and lateral left malleolus, no obvious deformity,  minimal swelling noted  Equal 2+ symmetric 2+ DP pulses bilaterally   Neurological:      Mental Status: She is alert and oriented to person, place, and time.      Comments: Patient able to plantar and dorsiflex at the left ankle however range of motion limited secondary to pain   Psychiatric:         Mood and Affect: Mood normal.         ED Course & MDM   ED Course as of 04/22/24 0349 Mon Apr 22, 2024   0300 XR ankle left 3+ views  I independently interpreted:  No obvious fracture [DANA]      ED Course User Index  [DANA] Demetrio Pittman DO         Diagnoses as of 04/22/24 0349   Sprain of left ankle, initial encounter       Medical Decision Making  23-year-old female without significant past medical history presents for left ankle pain after mechanical ground-level fall.  On exam, patient's vitals are normal, she is in no acute distress nontoxic-appearing, there is no tenderness to the chest, abdomen, pelvis, wrists, knees.  There is point tenderness to the left medial and lateral malleoli without obvious deformity.  Patient otherwise is neurologically intact able to dorsi and plantarflex at the left ankle though with limited range of motion secondary to pain.  She also has equal symmetric 2+ DP pulses bilaterally.  We did proceed with pain control in addition to plain films to evaluate for fracture.    Plain film showed no acute ankle or foot fracture.  Patient's pain improved following treatment.  Patient was placed in an air splint and educated on weightbearing as tolerated activities.  Patient given sports medicine follow-up and was discharged home with home-going analgesics.    Procedure  Procedures     Demetrio Pittman DO  Resident  04/22/24 2979

## 2024-04-22 NOTE — ED TRIAGE NOTES
Pt brought in by ems for c/o of L ankle pain after slipping down stairs. NO loc and no blood thinners.

## 2024-10-16 ENCOUNTER — HOSPITAL ENCOUNTER (EMERGENCY)
Facility: HOSPITAL | Age: 24
Discharge: HOME | End: 2024-10-16
Attending: STUDENT IN AN ORGANIZED HEALTH CARE EDUCATION/TRAINING PROGRAM
Payer: COMMERCIAL

## 2024-10-16 ENCOUNTER — CLINICAL SUPPORT (OUTPATIENT)
Dept: EMERGENCY MEDICINE | Facility: HOSPITAL | Age: 24
End: 2024-10-16
Payer: COMMERCIAL

## 2024-10-16 VITALS
HEIGHT: 69 IN | BODY MASS INDEX: 35.55 KG/M2 | RESPIRATION RATE: 16 BRPM | SYSTOLIC BLOOD PRESSURE: 145 MMHG | DIASTOLIC BLOOD PRESSURE: 82 MMHG | WEIGHT: 240 LBS | OXYGEN SATURATION: 99 % | TEMPERATURE: 98.6 F | HEART RATE: 87 BPM

## 2024-10-16 DIAGNOSIS — F11.93 OPIOID WITHDRAWAL (MULTI): Primary | ICD-10-CM

## 2024-10-16 PROCEDURE — 99284 EMERGENCY DEPT VISIT MOD MDM: CPT | Performed by: STUDENT IN AN ORGANIZED HEALTH CARE EDUCATION/TRAINING PROGRAM

## 2024-10-16 PROCEDURE — 93005 ELECTROCARDIOGRAM TRACING: CPT

## 2024-10-16 PROCEDURE — 2500000001 HC RX 250 WO HCPCS SELF ADMINISTERED DRUGS (ALT 637 FOR MEDICARE OP): Mod: SE | Performed by: STUDENT IN AN ORGANIZED HEALTH CARE EDUCATION/TRAINING PROGRAM

## 2024-10-16 PROCEDURE — 99283 EMERGENCY DEPT VISIT LOW MDM: CPT | Performed by: STUDENT IN AN ORGANIZED HEALTH CARE EDUCATION/TRAINING PROGRAM

## 2024-10-16 PROCEDURE — 2500000005 HC RX 250 GENERAL PHARMACY W/O HCPCS: Mod: SE | Performed by: STUDENT IN AN ORGANIZED HEALTH CARE EDUCATION/TRAINING PROGRAM

## 2024-10-16 PROCEDURE — 93010 ELECTROCARDIOGRAM REPORT: CPT | Performed by: STUDENT IN AN ORGANIZED HEALTH CARE EDUCATION/TRAINING PROGRAM

## 2024-10-16 RX ORDER — BUPRENORPHINE HYDROCHLORIDE AND NALOXONE HYDROCHLORIDE DIHYDRATE 8; 2 MG/1; MG/1
1 TABLET SUBLINGUAL ONCE
Status: DISCONTINUED | OUTPATIENT
Start: 2024-10-16 | End: 2024-10-16

## 2024-10-16 RX ORDER — ACETAMINOPHEN 325 MG/1
650 TABLET ORAL ONCE
Status: COMPLETED | OUTPATIENT
Start: 2024-10-16 | End: 2024-10-16

## 2024-10-16 RX ORDER — LOPERAMIDE HYDROCHLORIDE 2 MG/1
2 CAPSULE ORAL 4 TIMES DAILY PRN
Qty: 30 CAPSULE | Refills: 0 | Status: SHIPPED | OUTPATIENT
Start: 2024-10-16 | End: 2024-10-16

## 2024-10-16 RX ORDER — ONDANSETRON 4 MG/1
4 TABLET, FILM COATED ORAL EVERY 8 HOURS PRN
Qty: 12 TABLET | Refills: 0 | Status: SHIPPED | OUTPATIENT
Start: 2024-10-16 | End: 2024-10-19

## 2024-10-16 RX ORDER — LOPERAMIDE HYDROCHLORIDE 2 MG/1
2 CAPSULE ORAL 4 TIMES DAILY PRN
Qty: 30 CAPSULE | Refills: 0 | Status: SHIPPED | OUTPATIENT
Start: 2024-10-16 | End: 2024-10-24

## 2024-10-16 RX ORDER — ACETAMINOPHEN 500 MG
1000 TABLET ORAL EVERY 6 HOURS PRN
Qty: 30 TABLET | Refills: 0 | Status: SHIPPED | OUTPATIENT
Start: 2024-10-16 | End: 2024-10-26

## 2024-10-16 RX ORDER — ONDANSETRON 4 MG/1
4 TABLET, ORALLY DISINTEGRATING ORAL ONCE
Status: COMPLETED | OUTPATIENT
Start: 2024-10-16 | End: 2024-10-16

## 2024-10-16 RX ORDER — LOPERAMIDE HYDROCHLORIDE 2 MG/1
2 CAPSULE ORAL ONCE
Status: COMPLETED | OUTPATIENT
Start: 2024-10-16 | End: 2024-10-16

## 2024-10-16 RX ORDER — ONDANSETRON 4 MG/1
4 TABLET, FILM COATED ORAL EVERY 8 HOURS PRN
Qty: 12 TABLET | Refills: 0 | Status: SHIPPED | OUTPATIENT
Start: 2024-10-16 | End: 2024-10-16

## 2024-10-16 ASSESSMENT — LIFESTYLE VARIABLES
ORIENTATION AND CLOUDING OF SENSORIUM: ORIENTED AND CAN DO SERIAL ADDITIONS
NAUSEA AND VOMITING: MILD NAUSEA WITH NO VOMITING
TOTAL SCORE: 9
AGITATION: NORMAL ACTIVITY
HEADACHE, FULLNESS IN HEAD: MODERATE
PAROXYSMAL SWEATS: NO SWEAT VISIBLE
TREMOR: NOT VISIBLE, BUT CAN BE FELT FINGERTIP TO FINGERTIP
TACTILE DISTURBANCES: MILD ITCHING, PINS AND NEEDLES, BURNING OR NUMBNESS
VISUAL DISTURBANCES: NOT PRESENT
AUDITORY DISTURBANCES: NOT PRESENT
ANXIETY: 2
TOTAL_SCORE: 7

## 2024-10-16 ASSESSMENT — COLUMBIA-SUICIDE SEVERITY RATING SCALE - C-SSRS
1. IN THE PAST MONTH, HAVE YOU WISHED YOU WERE DEAD OR WISHED YOU COULD GO TO SLEEP AND NOT WAKE UP?: NO
6. HAVE YOU EVER DONE ANYTHING, STARTED TO DO ANYTHING, OR PREPARED TO DO ANYTHING TO END YOUR LIFE?: NO
2. HAVE YOU ACTUALLY HAD ANY THOUGHTS OF KILLING YOURSELF?: NO

## 2024-10-16 ASSESSMENT — PAIN SCALES - GENERAL
PAINLEVEL_OUTOF10: 9
PAINLEVEL_OUTOF10: 9

## 2024-10-16 ASSESSMENT — PAIN - FUNCTIONAL ASSESSMENT: PAIN_FUNCTIONAL_ASSESSMENT: 0-10

## 2024-10-16 NOTE — ED TRIAGE NOTES
Pt presents to the ED stating she is withdrawal  from percocet's. Pt states she started taking them 8 months ago after a bad ankle injury. Pt stgates her last time taking them was 5 days ago. Denies any other drugs or alcohol use besides marijuana. Pt started she hasz generalized pain, shakes, diarrhea, nausea, and hot flashes. Pt is currently breast feeding.

## 2024-10-16 NOTE — ED PROVIDER NOTES
CC: Withdrawal     HPI:  Patient is a 24-year-old female with no stated past medical history presents to the ED for opioid withdrawal. Patient noted that she has been taking Percocets over the past 8 months for a left ankle injury. She noted that was initially evaluated in the emergency department and noted to be fractured. She still noted pain of the injury. She acknowledges that she never followed up outpatient for the left ankle injury. She noted that she last took Percocets 5 days ago. 3 days ago, she started noting feeling nauseous, diarrhea, hot flashes, generalized pain, and shaking. She notes that she has taken ibuprofen and Tylenol without improvement.  Notes cannabis use.  Patient noted that she has previously been in [place] and came up to [place] on her own.  Denied alcohol, other illicit substance use, or tobacco use.  Notes that she gave birth a year ago, is breastfeeding and has kids in the area.  Denied fevers, chest pain, difficulty breathing, abdominal pain, headache, trauma, and falls.    Limitations to history: None  Independent historian(s): Patient  Records Reviewed: Recent available ED and inpatient notes reviewed in EMR.    PMHx/PSHx:  Per HPI.   - has a past medical history of  (normal spontaneous vaginal delivery) ([facility]) (2023).  - has no past surgical history on file.    Medications:  Reviewed in EMR. See EMR for complete list of medications and doses.    Allergies:  Patient has no known allergies.    Social History:  - Tobacco:  reports that she has never smoked. She has never used smokeless tobacco.   - Alcohol:  reports that she does not currently use alcohol.   - Illicit Drugs:  reports no history of drug use.     ROS:  Per HPI.       ???????????????????????????????????????????????????????????????  Triage Vitals:  T 37 °C (98.6 °F)  HR 88  /90  RR 16  O2 98 %      Physical Exam  Vitals and nursing note reviewed.   Constitutional:       General: She is not in  acute distress.  HENT:      Head: Normocephalic and atraumatic.      Nose: Nose normal.      Mouth/Throat:      Mouth: Mucous membranes are moist.   Eyes:      Conjunctiva/sclera: Conjunctivae normal.      Pupils: Pupils are equal, round, and reactive to light.   Cardiovascular:      Rate and Rhythm: Normal rate and regular rhythm.      Pulses: Normal pulses.   Pulmonary:      Effort: Pulmonary effort is normal. No respiratory distress.      Breath sounds: Normal breath sounds.   Abdominal:      Palpations: Abdomen is soft.      Tenderness: There is no abdominal tenderness.   Skin:     General: Skin is warm.   Neurological:      General: No focal deficit present.      Mental Status: She is alert and oriented to person, place, and time.      Cranial Nerves: No cranial nerve deficit.      Sensory: No sensory deficit.      Motor: No weakness.      Gait: Gait normal.       ???????????????????????????????????????????????????????????????  Labs:   Labs Reviewed - No data to display     Imaging:   No orders to display        EKG:  Rate is 76, sinus rhythm, rightward axis, no interval prolongation, no st elevation or depression.  When compared to EKG on 10/29/2023 review of EKG does not show any signs of STEMI, complete heart block, asystole, V-fib.    MDM:  Patient is a 24-year-old female with no stated past medical history presents to the ED for opioid withdrawal.  Patient presented HDS.  Physical exam findings significant for a 24-year-old female in no acute distress.  Patient noted to have a COWS of 7 for heart rate, stomach cramps, subjective chills, anxiousness, severe diffuse aching of joints and muscles, and nasal stuffiness.  Low clinical concern for acute infectious process, metabolic process, traumatic process, vascular process, and neurologic process.  Patient initially noted that she would be agreeable to initiating Suboxone in the emergency department and then continuing substance rehabilitation with [facility].   Upon discussion with [facility], patient noted that she must take care of her kids at home.  She declined [facility].  Discussed with patient that we can treat her symptoms supportively with Zofran, Tylenol, and Imodium.  Patient is agreeable.  Patient prescribed Zofran, Tylenol, and Imodium.  Patient has [facility] resources.  Patient ordered primary care and orthopedic surgery follow-up as needed.  Discussed ED findings, plan for outpatient primary care and orthopedic surgery follow-up as needed, [facility] resources, and strict return precautions for any new or worsening symptoms including but limited to persistent diarrhea and vomiting.  Patient stated understanding and agreement.  Patient discharged in stable condition.    ED Course:  Diagnoses as of 10/18/24 1141   Opioid withdrawal (Multi)       Social Determinants Limiting Care:  None identified    Disposition:  Discharge    Zenon Tirado MD   Emergency Medicine PGY-3  [facility]    Comment: Please note this report has been produced using speech recognition software and may contain errors related to that system including errors in grammar, punctuation, and spelling as well as words and phrases that may be inappropriate.  If there are any questions or concerns please feel free to contact the dictating provider for clarification.    Procedures ? SmartLinks last updated 10/16/2024 7:31 PM        Zenon Tirado MD  Resident  10/18/24 2247       Misael Vogle MD  10/19/24 1065

## 2024-10-17 LAB
ATRIAL RATE: 76 BPM
P AXIS: 63 DEGREES
P OFFSET: 189 MS
P ONSET: 134 MS
PR INTERVAL: 174 MS
Q ONSET: 221 MS
QRS COUNT: 13 BEATS
QRS DURATION: 84 MS
QT INTERVAL: 368 MS
QTC CALCULATION(BAZETT): 414 MS
QTC FREDERICIA: 398 MS
R AXIS: 90 DEGREES
T AXIS: 61 DEGREES
T OFFSET: 405 MS
VENTRICULAR RATE: 76 BPM

## 2024-10-17 NOTE — DISCHARGE INSTRUCTIONS
We offered you initiation of suboxone and placement in a detox facility. Our [facility] team saw you and offered you this placement. We understand that you do not want to do detox at this time, therefore we cannot start you on suboxone safely at this time as you would need to be continued on suboxone for months to years, and it would not be safe for us to start you on the medication here and not have you have follow up.  The phone number for the orthopedic surgery clinic is [phone].  The phone number for the primary care clinic at [facility] is [phone].

## 2024-10-23 ENCOUNTER — OFFICE VISIT (OUTPATIENT)
Dept: ORTHOPEDIC SURGERY | Facility: CLINIC | Age: 24
End: 2024-10-23
Payer: COMMERCIAL

## 2024-10-23 ENCOUNTER — HOSPITAL ENCOUNTER (OUTPATIENT)
Dept: RADIOLOGY | Facility: CLINIC | Age: 24
Discharge: HOME | End: 2024-10-23
Payer: COMMERCIAL

## 2024-10-23 DIAGNOSIS — M21.42 ACQUIRED FLEXIBLE PES PLANUS OF LEFT FOOT: ICD-10-CM

## 2024-10-23 DIAGNOSIS — S99.912A LEFT ANKLE INJURY, INITIAL ENCOUNTER: ICD-10-CM

## 2024-10-23 DIAGNOSIS — M21.6X2 ACQUIRED PRONATION DEFORMITY OF FOOT, LEFT: ICD-10-CM

## 2024-10-23 DIAGNOSIS — M76.822 POSTERIOR TIBIAL TENDON DYSFUNCTION (PTTD) OF LEFT LOWER EXTREMITY: Primary | ICD-10-CM

## 2024-10-23 PROCEDURE — 99213 OFFICE O/P EST LOW 20 MIN: CPT | Performed by: STUDENT IN AN ORGANIZED HEALTH CARE EDUCATION/TRAINING PROGRAM

## 2024-10-23 PROCEDURE — 73630 X-RAY EXAM OF FOOT: CPT | Mod: LT

## 2024-10-23 PROCEDURE — 99203 OFFICE O/P NEW LOW 30 MIN: CPT | Performed by: STUDENT IN AN ORGANIZED HEALTH CARE EDUCATION/TRAINING PROGRAM

## 2024-10-23 PROCEDURE — 73610 X-RAY EXAM OF ANKLE: CPT | Mod: LT

## 2024-10-23 RX ORDER — MELOXICAM 7.5 MG/1
7.5 TABLET ORAL DAILY
Qty: 14 TABLET | Refills: 0 | Status: SHIPPED | OUTPATIENT
Start: 2024-10-23 | End: 2024-11-06

## 2024-10-23 RX ORDER — CAPSAICIN 0 G/G
CREAM TOPICAL 4 TIMES DAILY PRN
Qty: 56.6 G | Refills: 0 | Status: SHIPPED | OUTPATIENT
Start: 2024-10-23 | End: 2024-11-22

## 2024-10-23 RX ORDER — GABAPENTIN 100 MG/1
100 CAPSULE ORAL NIGHTLY
Qty: 30 CAPSULE | Refills: 0 | Status: SHIPPED | OUTPATIENT
Start: 2024-10-23 | End: 2024-11-22

## 2024-10-23 ASSESSMENT — PAIN - FUNCTIONAL ASSESSMENT: PAIN_FUNCTIONAL_ASSESSMENT: 0-10

## 2024-10-23 ASSESSMENT — PAIN SCALES - GENERAL: PAINLEVEL_OUTOF10: 7

## 2024-10-23 ASSESSMENT — PAIN DESCRIPTION - DESCRIPTORS: DESCRIPTORS: ACHING;SHARP;SHOOTING;SPASM;STABBING

## 2024-10-23 NOTE — PROGRESS NOTES
ORTHOPAEDIC SURGERY OUTPATIENT PROGRESS NOTE    Chief Complaint:  Left foot pain    History Of Present Illness  Lavon Longo is a 24 y.o. female who presents for evaluation of left foot and ankle pain, self-referred.  Patient reports an ongoing history of left foot and ankle pain dating back to an injury sustained from 2024.  By chart review, the patient has been seen once in the ER for similar complaint and once recently for concern of opioid withdrawal.  She is currently reporting 7 out of 10 pain in the ankle that is unchanged.  She has not had any formal PT, bracing, orthotic or corticosteroid injections.  She has 2 young children at home and is on her feet extensively throughout the day.  She denies any interval trauma.     Past Medical History  Past Medical History:   Diagnosis Date     (normal spontaneous vaginal delivery) ([facility]) 2023    PNLs reviewed, wnl  GBS pending  ppBC: ppIUD  Admitted, consented, scanned - cephalic       Surgical History  Recent Surgeries in Orthopaedic Surgery            No cases to display             Social History  Social History     Socioeconomic History    Marital status: Single   Tobacco Use    Smoking status: Never    Smokeless tobacco: Never   Substance and Sexual Activity    Alcohol use: Not Currently    Drug use: Never     Social Drivers of Health     Financial Resource Strain: Medium Risk (2023)    Overall Financial Resource Strain (CARDIA)     Difficulty of Paying Living Expenses: Somewhat hard   Food Insecurity: No Food Insecurity (2023)    Hunger Vital Sign     Worried About Running Out of Food in the Last Year: Never true     Ran Out of Food in the Last Year: Never true   Transportation Needs: No Transportation Needs (2023)    PRAPARE - Transportation     Lack of Transportation (Medical): No     Lack of Transportation (Non-Medical): No   Stress: No Stress Concern Present (2023)    [employer] -  Occupational Stress Questionnaire     Feeling of Stress : Only a little   Social Connections: Moderately Isolated (11/13/2023)    Social Connection and Isolation Panel [NHANES]     Frequency of Communication with Friends and Family: Twice a week     Frequency of Social Gatherings with Friends and Family: More than three times a week     Attends Muslim Services: 1 to 4 times per year     Active Member of Clubs or Organizations: No     Attends Club or Organization Meetings: Never     Marital Status: Never    Intimate Partner Violence: Not At Risk (11/13/2023)    Humiliation, Afraid, Rape, and Kick questionnaire     Fear of Current or Ex-Partner: No     Emotionally Abused: No     Physically Abused: No     Sexually Abused: No       Family History  Family History   Problem Relation Name Age of Onset    No Known Problems Mother      No Known Problems Father          Allergies  No Known Allergies    Review of Systems  REVIEW OF SYSTEMS  Constitutional: no unplanned weight loss  Psychiatric: no suicidal ideation  ENT: no vision changes, no sinus problems  Pulmonary: no shortness of breath  Lymphatic: no enlarged lymph nodes  Cardiovascular: no chest pain or shortness of breath  Gastrointestinal: no stomach problems  Genitourinary: no dysuria   Skin: no rashes  Endocrine: no thyroid problems  Neurological: no headache, no numbness  Hematological: no easy bruising  Musculoskeletal: Left foot and ankle pain     Physical Exam  PHYSICAL EXAMINATION  Constitutional Exam: well developed and well nourished  Psychiatric Exam: alert and oriented, appropriate mood and behavior  Eye Exam: EOMI  Pulmonary Exam: breathing non-labored, no apparent distress  Lymphatic exam: no appreciable lymphadenopathy in the lower extremities  Cardiovascular exam: RRR to peripheral palpation, DP pulses 2+, PT 2+, toes are pink with good capillary refill, no pitting edema  Skin exam: no open lesions, rashes, abrasions or  ulcerations  Neurological exam: sensation to light touch intact in both lower extremities in peripheral and dermatomal distributions (except for any abnormalities noted in musculoskeletal exam)    Musculoskeletal exam: Left lower extremity examination.  Patient pain localized to fusilli about the foot and the ankle.  The majority of symptoms localized to the posterior medial aspect of the ankle, she is tender to palpation along the PTT.  Also with mild tenderness to palpation overlying the ATFL laterally.  Also with nonspecific tenderness to palpation overlying the anterior distal tibial soft tissues.  Patient has greater than physiologic hindfoot valgus with pes planus arch posture and no significant forefoot deformity noted.  Patient has pain with any attempted range of motion of the ankle, subtalar or midtarsal joints.  Pain does appear out of proportion to examination.  Patient has sensation grossly intact to light touch in the distal extremity.  She is intact but pain/effort limited PF/DF/EHL.  She has 2+ DP/PT pulses palpated.     Last Recorded Vitals  currently breastfeeding.    Laboratory Results  No results found for this or any previous visit (from the past 24 hours).     Radiology Results  X-ray imaging 3 view weightbearing left foot and ankle reviewed from 10/23/2024 and independently evaluated by me demonstrates no acute fracture or dislocation, ankle mortise appears well aligned.  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  I have reviewed the diagnosis and treatment options extensively with the patient.  I would recommend the patient continue weightbearing to her tolerance in her left lower extremity.  She may utilize a  lace up style ankle brace and I have recommended that she obtain OTC arch support.  I will prescribe her with a short course of meloxicam for severe pain, the patient is concerned that she has nerve related pain and I will recommend trialing gabapentin at night for nerve pain.  I will also provide her with a topical anti-inflammatory for use directly over the posterior tibialis tendon and provide her with a formal referral to physical therapy.  I will plan to see the patient back in approximately 8 weeks for repeat clinical evaluation.  The patient may certainly consider  in the future.  Upon return, patient would not require further imaging.    Álvaro Morgan MD, SHOBHA  Department of Orthopaedic Surgery  [facility]  The diagnosis and treatment plan were reviewed with the patient. All questions were answered. The patient verbalized understanding of the treatment plan. There were no barriers to understanding identified.    Note dictated with [employer] software.  Completed without full type editing and sent to avoid delay.

## 2024-10-24 ENCOUNTER — HOSPITAL ENCOUNTER (EMERGENCY)
Facility: HOSPITAL | Age: 24
Discharge: HOME | End: 2024-10-25
Attending: EMERGENCY MEDICINE
Payer: COMMERCIAL

## 2024-10-24 VITALS
SYSTOLIC BLOOD PRESSURE: 170 MMHG | HEART RATE: 100 BPM | TEMPERATURE: 97.1 F | DIASTOLIC BLOOD PRESSURE: 87 MMHG | OXYGEN SATURATION: 100 % | HEIGHT: 69 IN | WEIGHT: 260 LBS | RESPIRATION RATE: 18 BRPM | BODY MASS INDEX: 38.51 KG/M2

## 2024-10-24 DIAGNOSIS — R11.2 NAUSEA AND VOMITING, UNSPECIFIED VOMITING TYPE: Primary | ICD-10-CM

## 2024-10-24 PROCEDURE — 99284 EMERGENCY DEPT VISIT MOD MDM: CPT | Performed by: EMERGENCY MEDICINE

## 2024-10-24 PROCEDURE — 99284 EMERGENCY DEPT VISIT MOD MDM: CPT

## 2024-10-24 PROCEDURE — 93010 ELECTROCARDIOGRAM REPORT: CPT | Performed by: EMERGENCY MEDICINE

## 2024-10-24 RX ORDER — KETOROLAC TROMETHAMINE 30 MG/ML
30 INJECTION, SOLUTION INTRAMUSCULAR; INTRAVENOUS ONCE
Status: COMPLETED | OUTPATIENT
Start: 2024-10-24 | End: 2024-10-25

## 2024-10-24 RX ORDER — ONDANSETRON HYDROCHLORIDE 2 MG/ML
4 INJECTION, SOLUTION INTRAVENOUS ONCE
Status: COMPLETED | OUTPATIENT
Start: 2024-10-24 | End: 2024-10-25

## 2024-10-24 ASSESSMENT — PAIN - FUNCTIONAL ASSESSMENT: PAIN_FUNCTIONAL_ASSESSMENT: 0-10

## 2024-10-24 ASSESSMENT — COLUMBIA-SUICIDE SEVERITY RATING SCALE - C-SSRS
6. HAVE YOU EVER DONE ANYTHING, STARTED TO DO ANYTHING, OR PREPARED TO DO ANYTHING TO END YOUR LIFE?: NO
2. HAVE YOU ACTUALLY HAD ANY THOUGHTS OF KILLING YOURSELF?: NO
1. IN THE PAST MONTH, HAVE YOU WISHED YOU WERE DEAD OR WISHED YOU COULD GO TO SLEEP AND NOT WAKE UP?: NO

## 2024-10-24 ASSESSMENT — PAIN SCALES - GENERAL: PAINLEVEL_OUTOF10: 0 - NO PAIN

## 2024-10-25 ENCOUNTER — CLINICAL SUPPORT (OUTPATIENT)
Dept: EMERGENCY MEDICINE | Facility: HOSPITAL | Age: 24
End: 2024-10-25
Payer: COMMERCIAL

## 2024-10-25 LAB
ALBUMIN SERPL BCP-MCNC: 4.8 G/DL (ref 3.4–5)
ALP SERPL-CCNC: 72 U/L (ref 33–110)
ALT SERPL W P-5'-P-CCNC: 15 U/L (ref 7–45)
ANION GAP SERPL CALC-SCNC: 20 MMOL/L (ref 10–20)
AST SERPL W P-5'-P-CCNC: 17 U/L (ref 9–39)
BASOPHILS # BLD AUTO: 0.02 X10*3/UL (ref 0–0.1)
BASOPHILS NFR BLD AUTO: 0.2 %
BILIRUB SERPL-MCNC: 0.4 MG/DL (ref 0–1.2)
BUN SERPL-MCNC: 13 MG/DL (ref 6–23)
CALCIUM SERPL-MCNC: 9.9 MG/DL (ref 8.6–10.6)
CHLORIDE SERPL-SCNC: 99 MMOL/L (ref 98–107)
CO2 SERPL-SCNC: 22 MMOL/L (ref 21–32)
CREAT SERPL-MCNC: 0.73 MG/DL (ref 0.5–1.05)
EGFRCR SERPLBLD CKD-EPI 2021: >90 ML/MIN/1.73M*2
EOSINOPHIL # BLD AUTO: 0 X10*3/UL (ref 0–0.7)
EOSINOPHIL NFR BLD AUTO: 0 %
ERYTHROCYTE [DISTWIDTH] IN BLOOD BY AUTOMATED COUNT: 12.7 % (ref 11.5–14.5)
GLUCOSE SERPL-MCNC: 142 MG/DL (ref 74–99)
HCT VFR BLD AUTO: 41.4 % (ref 36–46)
HGB BLD-MCNC: 13.9 G/DL (ref 12–16)
IMM GRANULOCYTES # BLD AUTO: 0.09 X10*3/UL (ref 0–0.7)
IMM GRANULOCYTES NFR BLD AUTO: 0.7 % (ref 0–0.9)
LYMPHOCYTES # BLD AUTO: 0.98 X10*3/UL (ref 1.2–4.8)
LYMPHOCYTES NFR BLD AUTO: 7.7 %
MAGNESIUM SERPL-MCNC: 1.94 MG/DL (ref 1.6–2.4)
MCH RBC QN AUTO: 29.4 PG (ref 26–34)
MCHC RBC AUTO-ENTMCNC: 33.6 G/DL (ref 32–36)
MCV RBC AUTO: 88 FL (ref 80–100)
MONOCYTES # BLD AUTO: 0.27 X10*3/UL (ref 0.1–1)
MONOCYTES NFR BLD AUTO: 2.1 %
NEUTROPHILS # BLD AUTO: 11.4 X10*3/UL (ref 1.2–7.7)
NEUTROPHILS NFR BLD AUTO: 89.3 %
NRBC BLD-RTO: 0 /100 WBCS (ref 0–0)
PLATELET # BLD AUTO: 290 X10*3/UL (ref 150–450)
POTASSIUM SERPL-SCNC: 3.4 MMOL/L (ref 3.5–5.3)
PROT SERPL-MCNC: 8.7 G/DL (ref 6.4–8.2)
RBC # BLD AUTO: 4.72 X10*6/UL (ref 4–5.2)
SODIUM SERPL-SCNC: 138 MMOL/L (ref 136–145)
WBC # BLD AUTO: 12.8 X10*3/UL (ref 4.4–11.3)

## 2024-10-25 PROCEDURE — 80053 COMPREHEN METABOLIC PANEL: CPT

## 2024-10-25 PROCEDURE — 85025 COMPLETE CBC W/AUTO DIFF WBC: CPT

## 2024-10-25 PROCEDURE — 96375 TX/PRO/DX INJ NEW DRUG ADDON: CPT

## 2024-10-25 PROCEDURE — 2500000005 HC RX 250 GENERAL PHARMACY W/O HCPCS: Mod: SE

## 2024-10-25 PROCEDURE — 93005 ELECTROCARDIOGRAM TRACING: CPT

## 2024-10-25 PROCEDURE — 2500000004 HC RX 250 GENERAL PHARMACY W/ HCPCS (ALT 636 FOR OP/ED): Mod: SE

## 2024-10-25 PROCEDURE — 83735 ASSAY OF MAGNESIUM: CPT

## 2024-10-25 PROCEDURE — 36415 COLL VENOUS BLD VENIPUNCTURE: CPT

## 2024-10-25 PROCEDURE — 96374 THER/PROPH/DIAG INJ IV PUSH: CPT

## 2024-10-25 RX ORDER — ONDANSETRON 4 MG/1
4 TABLET, FILM COATED ORAL EVERY 6 HOURS
Qty: 12 TABLET | Refills: 0 | Status: SHIPPED | OUTPATIENT
Start: 2024-10-25 | End: 2024-10-28

## 2024-10-25 RX ORDER — ONDANSETRON 4 MG/1
4 TABLET, ORALLY DISINTEGRATING ORAL ONCE
Status: COMPLETED | OUTPATIENT
Start: 2024-10-25 | End: 2024-10-25

## 2024-10-25 NOTE — ED PROVIDER NOTES
Emergency Department Provider Note        History of Present Illness     History provided by: Patient  Limitations to History: None  External Records Reviewed with Brief Summary: None    HPI:  Lavon Longo is a 24 y.o. female with history of opioid use disorder, presenting to the ED with nausea and vomiting.  Patient reports she had her first treatment of Suboxone today, and developed symptoms after.  She is experiencing nausea, vomiting, headache, dizziness, abdominal pain.  No fevers, chills, chest pain.    Physical Exam   Triage vitals:  T 36.2 °C (97.1 °F)    /87  RR 18  O2 100 % None (Room air)    General: Awake, appearing uncomfortable, no acute distress  Eyes: Gaze conjugate.  No scleral icterus or injection  HENT: Normo-cephalic, atraumatic. No stridor  CV: Regular rate, regular rhythm. Radial pulses 2+ bilaterally  Resp: Breathing non-labored, speaking in full sentences.  Clear to auscultation bilaterally  GI: Soft, non-distended, non-tender. No rebound or guarding.  MSK/Extremities: No gross bony deformities. Moving all extremities  Skin: Warm. Appropriate color  Neuro: Alert. Oriented. Face symmetric. Speech is fluent.  Gross strength and sensation intact in b/l UE and LEs  Psych: Appropriate mood and affect    Medical Decision Making & ED Course   Medical Decision Makin y.o. female with past medical history as noted above, presenting to the ED with nausea vomiting.  Vital signs are stable on arrival.  On exam, patient is appearing uncomfortable.  Symptoms may be related to opioid withdrawal versus acute viral gastroenteritis.  Will obtain labs to ensure electrolytes are WNL and there is no leukocytosis.  Patient will be treated with IV fluids and Zofran.    On reevaluation, patient feeling improvement in symptoms after treatment.  Labs are unremarkable.  At this point, I think it is appropriate for the patient to be discharged with close follow-up.  Discussed return precautions,  patient verbally agreed.  Patient discharged in stable condition.  ----      Differential diagnoses considered include but are not limited to: Suboxone overdose, viral gastroenteritis     Social Determinants of Health which Significantly Impact Care: None identified     EKG Independent Interpretation: The EKG obtained at 0012 was independently interpreted by myself. It demonstrates normal sinus rhythm with a ventricular rate of 60.  Normal axis. Intervals normal. ST segments showed no elevation or other signs of ischemia.  No STEMI.  No significant changes when compared to prior EKG from 10/16/24.    Independent Result Review and Interpretation: Relevant laboratory and radiographic results were reviewed and independently interpreted by myself.  As necessary, they are commented on in the ED Course.    Chronic conditions affecting the patient's care: As documented above in [facility]    The patient was discussed with the following consultants/services: None    Care Considerations: As documented above in [facility]    ED Course:  ED Course as of 10/25/24 0154   Fri Oct 25, 2024   0154 CBC and Auto Differential(!)  Unremarkable. [NM]   0154 Comprehensive metabolic panel(!)  Unremarkable. [NM]      ED Course User Index  [NM] Carmelina Martin DO         Diagnoses as of 10/25/24 0154   Nausea and vomiting, unspecified vomiting type     Disposition   As a result of the work-up, the patient was discharged home.  she was informed of her diagnosis and instructed to come back with any concerns or worsening of condition.  she and was agreeable to the plan as discussed above.  she was given the opportunity to ask questions.  All of the patient's questions were answered.    Procedures   Procedures    This was a shared visit with an ED attending.  The patient was seen and discussed with the ED attending    Carmelina Martin DO  Emergency Medicine     Carmelina Martin DO  Resident  10/25/24 0155

## 2024-10-25 NOTE — ED TRIAGE NOTES
Pt presented with c/o N/V pt states that she took her first dose of suboxone today and believes it was the medication

## 2024-10-25 NOTE — DISCHARGE INSTRUCTIONS
You have been evaluated in the Emergency Department today for your nausea and vomiting. Your evaluation suggests that your symptoms are most likely to do a viral infection which will improve on its own with rest and fluids.    Please follow up with your primary care physician within two days.    Remember to drink plenty of fluids at home.    Return to the Emergency Department if you experience worsening or uncontrolled pain, inability to tolerate fluids by mouth, difficulty breathing, fevers 100.4°F or greater, recurrent vomiting, or any other concerning symptoms.

## 2024-10-26 LAB
ATRIAL RATE: 60 BPM
P AXIS: 31 DEGREES
P OFFSET: 184 MS
P ONSET: 131 MS
PR INTERVAL: 170 MS
Q ONSET: 216 MS
QRS COUNT: 10 BEATS
QRS DURATION: 94 MS
QT INTERVAL: 436 MS
QTC CALCULATION(BAZETT): 436 MS
QTC FREDERICIA: 436 MS
R AXIS: 84 DEGREES
T AXIS: 56 DEGREES
T OFFSET: 434 MS
VENTRICULAR RATE: 60 BPM

## 2025-01-15 DIAGNOSIS — M76.822 POSTERIOR TIBIAL TENDON DYSFUNCTION (PTTD) OF LEFT LOWER EXTREMITY: ICD-10-CM

## 2025-03-18 ENCOUNTER — APPOINTMENT (OUTPATIENT)
Dept: PRIMARY CARE | Facility: CLINIC | Age: 25
End: 2025-03-18
Payer: COMMERCIAL

## 2025-03-18 VITALS
WEIGHT: 277 LBS | BODY MASS INDEX: 41.03 KG/M2 | DIASTOLIC BLOOD PRESSURE: 78 MMHG | HEIGHT: 69 IN | SYSTOLIC BLOOD PRESSURE: 146 MMHG | OXYGEN SATURATION: 98 % | HEART RATE: 86 BPM

## 2025-03-18 DIAGNOSIS — G89.29 CHRONIC LEFT-SIDED LOW BACK PAIN WITH LEFT-SIDED SCIATICA: ICD-10-CM

## 2025-03-18 DIAGNOSIS — M54.42 CHRONIC LEFT-SIDED LOW BACK PAIN WITH LEFT-SIDED SCIATICA: ICD-10-CM

## 2025-03-18 DIAGNOSIS — R00.2 PALPITATIONS: Primary | ICD-10-CM

## 2025-03-18 DIAGNOSIS — E66.01 CLASS 3 SEVERE OBESITY DUE TO EXCESS CALORIES WITHOUT SERIOUS COMORBIDITY WITH BODY MASS INDEX (BMI) OF 40.0 TO 44.9 IN ADULT: ICD-10-CM

## 2025-03-18 DIAGNOSIS — F19.11 HISTORY OF DRUG ABUSE IN REMISSION (MULTI): ICD-10-CM

## 2025-03-18 DIAGNOSIS — R03.0 ELEVATED BLOOD PRESSURE READING: ICD-10-CM

## 2025-03-18 DIAGNOSIS — K21.9 GASTROESOPHAGEAL REFLUX DISEASE WITHOUT ESOPHAGITIS: ICD-10-CM

## 2025-03-18 DIAGNOSIS — M25.562 CHRONIC PAIN OF LEFT KNEE: ICD-10-CM

## 2025-03-18 DIAGNOSIS — F11.93 OPIOID WITHDRAWAL (MULTI): ICD-10-CM

## 2025-03-18 DIAGNOSIS — E04.9 GOITER: ICD-10-CM

## 2025-03-18 DIAGNOSIS — L30.9 ECZEMA, UNSPECIFIED TYPE: ICD-10-CM

## 2025-03-18 DIAGNOSIS — E66.813 CLASS 3 SEVERE OBESITY DUE TO EXCESS CALORIES WITHOUT SERIOUS COMORBIDITY WITH BODY MASS INDEX (BMI) OF 40.0 TO 44.9 IN ADULT: ICD-10-CM

## 2025-03-18 DIAGNOSIS — G89.29 CHRONIC PAIN OF LEFT KNEE: ICD-10-CM

## 2025-03-18 DIAGNOSIS — Z86.32 HISTORY OF GESTATIONAL DIABETES MELLITUS (GDM): ICD-10-CM

## 2025-03-18 PROBLEM — O24.419 GESTATIONAL DIABETES MELLITUS (GDM) AFFECTING PREGNANCY (HHS-HCC): Status: RESOLVED | Noted: 2025-03-18 | Resolved: 2025-03-18

## 2025-03-18 PROBLEM — O99.213 OBESITY COMPLICATING PREGNANCY, THIRD TRIMESTER (HHS-HCC): Status: RESOLVED | Noted: 2025-03-18 | Resolved: 2025-03-18

## 2025-03-18 PROBLEM — N91.2 AMENORRHEA: Status: RESOLVED | Noted: 2025-03-18 | Resolved: 2025-03-18

## 2025-03-18 PROBLEM — O13.9 GESTATIONAL HYPERTENSION, ANTEPARTUM (HHS-HCC): Status: RESOLVED | Noted: 2023-10-19 | Resolved: 2025-03-18

## 2025-03-18 PROBLEM — O09.90 HIGH RISK PREGNANCY, ANTEPARTUM (HHS-HCC): Status: RESOLVED | Noted: 2023-10-20 | Resolved: 2025-03-18

## 2025-03-18 PROBLEM — O10.013: Status: RESOLVED | Noted: 2025-03-18 | Resolved: 2025-03-18

## 2025-03-18 PROBLEM — Z3A.38 38 WEEKS GESTATION OF PREGNANCY (HHS-HCC): Status: RESOLVED | Noted: 2023-10-24 | Resolved: 2025-03-18

## 2025-03-18 PROBLEM — O11.9: Status: RESOLVED | Noted: 2025-03-18 | Resolved: 2025-03-18

## 2025-03-18 PROCEDURE — 3008F BODY MASS INDEX DOCD: CPT

## 2025-03-18 PROCEDURE — 1036F TOBACCO NON-USER: CPT

## 2025-03-18 PROCEDURE — 99204 OFFICE O/P NEW MOD 45 MIN: CPT

## 2025-03-18 RX ORDER — FAMOTIDINE 20 MG/1
20 TABLET, FILM COATED ORAL 2 TIMES DAILY
Qty: 120 TABLET | Refills: 0 | Status: SHIPPED | OUTPATIENT
Start: 2025-03-18 | End: 2025-05-17

## 2025-03-18 RX ORDER — CLOBETASOL PROPIONATE 0.5 MG/G
CREAM TOPICAL 2 TIMES DAILY
Qty: 15 G | Refills: 0 | Status: SHIPPED | OUTPATIENT
Start: 2025-03-18 | End: 2025-04-01

## 2025-03-18 RX ORDER — MELOXICAM 15 MG/1
1 TABLET ORAL DAILY PRN
COMMUNITY
Start: 2025-01-29

## 2025-03-18 ASSESSMENT — PATIENT HEALTH QUESTIONNAIRE - PHQ9
1. LITTLE INTEREST OR PLEASURE IN DOING THINGS: NOT AT ALL
SUM OF ALL RESPONSES TO PHQ9 QUESTIONS 1 AND 2: 0
2. FEELING DOWN, DEPRESSED OR HOPELESS: NOT AT ALL

## 2025-03-18 ASSESSMENT — ENCOUNTER SYMPTOMS
DEPRESSION: 0
OCCASIONAL FEELINGS OF UNSTEADINESS: 0
LOSS OF SENSATION IN FEET: 0

## 2025-03-18 NOTE — PROGRESS NOTES
Primary Care Provider: TOM Chamberlain-CNP    Subjective   Lavon Longo is a 24 y.o. female who presents for New Patient Visit (Aching muscles/joints/Was taking percocets for ankle not taking anymore /Chills/Gas feels the pain more when gassy/No heartburn /Right arm has something under it /Feeling cramps severe sharp pain lower abdomen /Had a baby a year ago. /Heat flutters here and there at random dont feel SOB/?anxiety ).    HPI   NPV/ est care    Multiple concerns    Here today with Mother    LMP: about 3/1/25, regular menstrual cycle  Last pap- 2022 in [place] per patient was WNL  Declines influenza vaccines but is okay with all other vaccines    Had Body chills, achy, nasal congestion- resolved now    Percocet abuse- was taking for about 3-4 months; off this since October 2024 she states she had withdrawals from this at that time; was never rx this (got from 'street')  Caffeine use  Smokes Mariajuana daily - for years now; not ready to quit- encouraged her to quit  No tobacco use  Denies alcohol use    Sharp stomach pain, more with gas; worse at night and after eating  Start after taking meloxicam    left ankle injury - Fell downstairs last April 2023- sprain  Currently with fitted tall walker boot , was previously using crutches  Left knee pain and back also  Recent PT started  Low back pain with sciatica, down left leg  Left elbow pain  Left finger swollen but not swollen today    Rash on Bl fingers- side, itches    Has bump under armpit for years, does not hurt, no change in size; about 2 mm in size    Headaches, come and goes, happen in middle of day  Started 1.5 months ago  BL temporal area, throbbing  No vision changes  Takes tylenol and drinks water which helps the HA go away    Goiter  Heart fluttering- skipping heart beat- going on for years now; saw cardiologist in [place], had Holter monitor          Review of Systems  The remainder of the ROS was negative unless otherwise stated in the  "HPI.       Objective   /78   Pulse 86   Ht 1.753 m (5' 9\")   Wt 126 kg (277 lb)   SpO2 98%   BMI 40.91 kg/m²     Physical Exam  Vitals reviewed.   Constitutional:       General: She is not in acute distress.     Appearance: Normal appearance. She is normal weight. She is not ill-appearing, toxic-appearing or diaphoretic.   HENT:      Head: Normocephalic and atraumatic.      Nose: Nose normal.   Eyes:      Conjunctiva/sclera: Conjunctivae normal.   Neck:      Comments: + Goiter  Cardiovascular:      Rate and Rhythm: Normal rate and regular rhythm.      Pulses: Normal pulses.      Heart sounds: Normal heart sounds. No murmur heard.     No friction rub. No gallop.   Pulmonary:      Effort: Pulmonary effort is normal. No respiratory distress.      Breath sounds: Normal breath sounds.   Abdominal:      General: Abdomen is flat. Bowel sounds are normal.      Palpations: Abdomen is soft.   Musculoskeletal:      Cervical back: Normal range of motion.      Lumbar back: Negative right straight leg raise test and negative left straight leg raise test.      Left knee: ACL laxity present.   Skin:     General: Skin is warm and dry.      Comments: Eczema BL hand/ finger    1-2 mm right sided armpit ingrown hair   Neurological:      General: No focal deficit present.      Mental Status: She is alert and oriented to person, place, and time. Mental status is at baseline.   Psychiatric:         Mood and Affect: Mood normal.         Behavior: Behavior normal.         Thought Content: Thought content normal.         Judgment: Judgment normal.         Assessment/Plan   Problem List Items Addressed This Visit    NPV/ est care    Multiple concerns/ symptoms           ICD-10-CM    Goiter - Primary E04.9    Recheck labs and US  Relevant Orders    US thyroid    T3, free    T4, free    TSH    Obesity E66.9    Persisting  Lifestyle changes encouraged  Relevant Orders    Urinalysis with Reflex Microscopic    Hemoglobin A1C    Lipid " Panel    History of gestational diabetes mellitus (GDM) Z86.32    Stable  Recheck labs  Relevant Orders    Urinalysis with Reflex Microscopic    Hemoglobin A1C    Lipid Panel    Gastroesophageal reflux disease without esophagitis K21.9    persisting  Relevant Medications    famotidine (Pepcid) 20 mg tablet    Opioid withdrawal (Multi)  Stable  In remission  Percocet abuse- was taking for about 3-4 months; off this since October 2024 she states she had withdrawals from this at that time; was never rx this (got from 'street')  Caffeine use  Smokes [facility] daily - for years now; not ready to quit- encouraged her to quit  No tobacco use  Denies alcohol use F11.93    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)  Stable  In remission  Percocet abuse- was taking for about 3-4 months; off this since October 2024 she states she had withdrawals from this at that time; was never rx this (got from 'street')  Caffeine use  Smokes [facility] daily - for years now; not ready to quit- encouraged her to quit  No tobacco use  Denies alcohol use F19.11     Other Visit Diagnoses         Codes    Palpitations     R00.2    IO EKG machine broken- unable to perform IO- she will have to go to cardiology to have this scheduled  Relevant Orders    US thyroid    T3, free    T4, free    TSH    ECG 12 lead (Clinic Performed)    CBC and Auto Differential    Comprehensive metabolic panel    Magnesium    Urinalysis with Reflex Microscopic    ECG 12 lead (Ancillary Performed)    Chronic left-sided low back pain with left-sided sciatica     M54.42, G89.29    Most likely from fall or boot/ crutches use  Suspect lumbar strain  Ice; started PT- c/w this  Relevant Orders    XR lumbar spine 2-3 views    Eczema, unspecified type     L30.9    stable  Relevant Medications    clobetasol (Temovate) 0.05 % cream    Chronic pain of left knee     M25.562, G89.29    Most likely from fall or boot/ crutches use  Suspect ACL strain  Ice; started PT- c/w  this  Relevant Orders    XR knee left 1-2 views    Elevated blood pressure reading     R03.0   Persisting  Recheck in 4-6 weeks  Low sodium diet  Blood work ordered     Headaches:  Improve with water and tylenol  Possible related to dehydration  Increase water intake and c/w tylenol  Start HA journal- follow up further if persisting at next visit      Follow up in 4-6 weeks or sooner if needed

## 2025-03-19 LAB
ALBUMIN SERPL-MCNC: 4.2 G/DL (ref 3.6–5.1)
ALP SERPL-CCNC: 69 U/L (ref 31–125)
ALT SERPL-CCNC: 14 U/L (ref 6–29)
ANION GAP SERPL CALCULATED.4IONS-SCNC: 8 MMOL/L (CALC) (ref 7–17)
APPEARANCE UR: CLEAR
AST SERPL-CCNC: 18 U/L (ref 10–30)
BASOPHILS # BLD AUTO: 38 CELLS/UL (ref 0–200)
BASOPHILS NFR BLD AUTO: 0.6 %
BILIRUB SERPL-MCNC: 0.2 MG/DL (ref 0.2–1.2)
BILIRUB UR QL STRIP: NEGATIVE
BUN SERPL-MCNC: 15 MG/DL (ref 7–25)
CALCIUM SERPL-MCNC: 8.9 MG/DL (ref 8.6–10.2)
CHLORIDE SERPL-SCNC: 107 MMOL/L (ref 98–110)
CHOLEST SERPL-MCNC: 174 MG/DL
CHOLEST/HDLC SERPL: 5.4 (CALC)
CO2 SERPL-SCNC: 22 MMOL/L (ref 20–32)
COLOR UR: YELLOW
CREAT SERPL-MCNC: 0.67 MG/DL (ref 0.5–0.96)
EGFRCR SERPLBLD CKD-EPI 2021: 125 ML/MIN/1.73M2
EOSINOPHIL # BLD AUTO: 221 CELLS/UL (ref 15–500)
EOSINOPHIL NFR BLD AUTO: 3.5 %
ERYTHROCYTE [DISTWIDTH] IN BLOOD BY AUTOMATED COUNT: 13 % (ref 11–15)
EST. AVERAGE GLUCOSE BLD GHB EST-MCNC: 117 MG/DL
EST. AVERAGE GLUCOSE BLD GHB EST-SCNC: 6.5 MMOL/L
GLUCOSE SERPL-MCNC: 112 MG/DL (ref 65–99)
GLUCOSE UR QL STRIP: NEGATIVE
HBA1C MFR BLD: 5.7 % OF TOTAL HGB
HCT VFR BLD AUTO: 38.3 % (ref 35–45)
HDLC SERPL-MCNC: 32 MG/DL
HGB BLD-MCNC: 12.3 G/DL (ref 11.7–15.5)
HGB UR QL STRIP: NEGATIVE
KETONES UR QL STRIP: NEGATIVE
LDLC SERPL CALC-MCNC: 105 MG/DL (CALC)
LEUKOCYTE ESTERASE UR QL STRIP: NEGATIVE
LYMPHOCYTES # BLD AUTO: 2722 CELLS/UL (ref 850–3900)
LYMPHOCYTES NFR BLD AUTO: 43.2 %
MAGNESIUM SERPL-MCNC: 1.9 MG/DL (ref 1.5–2.5)
MCH RBC QN AUTO: 29.1 PG (ref 27–33)
MCHC RBC AUTO-ENTMCNC: 32.1 G/DL (ref 32–36)
MCV RBC AUTO: 90.5 FL (ref 80–100)
MONOCYTES # BLD AUTO: 523 CELLS/UL (ref 200–950)
MONOCYTES NFR BLD AUTO: 8.3 %
NEUTROPHILS # BLD AUTO: 2797 CELLS/UL (ref 1500–7800)
NEUTROPHILS NFR BLD AUTO: 44.4 %
NITRITE UR QL STRIP: NEGATIVE
NONHDLC SERPL-MCNC: 142 MG/DL (CALC)
PH UR STRIP: 6.5 [PH] (ref 5–8)
PLATELET # BLD AUTO: 244 THOUSAND/UL (ref 140–400)
PMV BLD REES-ECKER: 10 FL (ref 7.5–12.5)
POTASSIUM SERPL-SCNC: 4.2 MMOL/L (ref 3.5–5.3)
PROT SERPL-MCNC: 6.9 G/DL (ref 6.1–8.1)
PROT UR QL STRIP: NEGATIVE
RBC # BLD AUTO: 4.23 MILLION/UL (ref 3.8–5.1)
SODIUM SERPL-SCNC: 137 MMOL/L (ref 135–146)
SP GR UR STRIP: 1.02 (ref 1–1.03)
T3FREE SERPL-MCNC: 3.2 PG/ML (ref 2.3–4.2)
T4 FREE SERPL-MCNC: 0.9 NG/DL (ref 0.8–1.8)
TRIGL SERPL-MCNC: 262 MG/DL
TSH SERPL-ACNC: 0.95 MIU/L
WBC # BLD AUTO: 6.3 THOUSAND/UL (ref 3.8–10.8)

## 2025-03-24 ENCOUNTER — TELEPHONE (OUTPATIENT)
Dept: PRIMARY CARE | Facility: CLINIC | Age: 25
End: 2025-03-24
Payer: COMMERCIAL